# Patient Record
Sex: MALE | Race: WHITE | NOT HISPANIC OR LATINO | Employment: FULL TIME | URBAN - METROPOLITAN AREA
[De-identification: names, ages, dates, MRNs, and addresses within clinical notes are randomized per-mention and may not be internally consistent; named-entity substitution may affect disease eponyms.]

---

## 2017-02-11 ENCOUNTER — APPOINTMENT (OUTPATIENT)
Dept: GENERAL RADIOLOGY | Facility: HOSPITAL | Age: 52
End: 2017-02-11

## 2017-02-11 ENCOUNTER — HOSPITAL ENCOUNTER (EMERGENCY)
Facility: HOSPITAL | Age: 52
Discharge: HOME | End: 2017-02-11
Attending: EMERGENCY MEDICINE | Admitting: EMERGENCY MEDICINE

## 2017-02-11 ENCOUNTER — APPOINTMENT (OUTPATIENT)
Dept: CT IMAGING | Facility: HOSPITAL | Age: 52
End: 2017-02-11

## 2017-02-11 VITALS
TEMPERATURE: 98.2 F | DIASTOLIC BLOOD PRESSURE: 83 MMHG | OXYGEN SATURATION: 95 % | SYSTOLIC BLOOD PRESSURE: 108 MMHG | RESPIRATION RATE: 18 BRPM | HEIGHT: 70 IN | HEART RATE: 72 BPM | WEIGHT: 175 LBS | BODY MASS INDEX: 25.05 KG/M2

## 2017-02-11 DIAGNOSIS — R07.89 ATYPICAL CHEST PAIN: ICD-10-CM

## 2017-02-11 DIAGNOSIS — I10 ESSENTIAL HYPERTENSION: Chronic | ICD-10-CM

## 2017-02-11 DIAGNOSIS — I25.10 CORONARY ARTERY DISEASE INVOLVING NATIVE CORONARY ARTERY OF NATIVE HEART WITHOUT ANGINA PECTORIS: ICD-10-CM

## 2017-02-11 DIAGNOSIS — Z72.0 TOBACCO ABUSE: Chronic | ICD-10-CM

## 2017-02-11 DIAGNOSIS — J18.9 PNEUMONIA OF LEFT LOWER LOBE DUE TO INFECTIOUS ORGANISM: Primary | ICD-10-CM

## 2017-02-11 LAB
ALBUMIN SERPL-MCNC: 3.7 G/DL (ref 3.2–4.8)
ALBUMIN/GLOB SERPL: 1.2 G/DL (ref 1.5–2.5)
ALP SERPL-CCNC: 107 U/L (ref 25–100)
ALT SERPL W P-5'-P-CCNC: 58 U/L (ref 7–40)
ANION GAP SERPL CALCULATED.3IONS-SCNC: 6 MMOL/L (ref 3–11)
APTT PPP: 87.8 SECONDS (ref 24–31)
AST SERPL-CCNC: 48 U/L (ref 0–33)
BASOPHILS # BLD AUTO: 0.03 10*3/MM3 (ref 0–0.2)
BASOPHILS NFR BLD AUTO: 0.5 % (ref 0–1)
BILIRUB SERPL-MCNC: 0.3 MG/DL (ref 0.3–1.2)
BNP SERPL-MCNC: 29 PG/ML (ref 0–100)
BUN BLD-MCNC: 15 MG/DL (ref 9–23)
BUN/CREAT SERPL: 21.4 (ref 7–25)
CALCIUM SPEC-SCNC: 9.6 MG/DL (ref 8.7–10.4)
CHLORIDE SERPL-SCNC: 108 MMOL/L (ref 99–109)
CO2 SERPL-SCNC: 25 MMOL/L (ref 20–31)
CREAT BLD-MCNC: 0.7 MG/DL (ref 0.6–1.3)
DEPRECATED RDW RBC AUTO: 45.6 FL (ref 37–54)
EOSINOPHIL # BLD AUTO: 0 10*3/MM3 (ref 0.1–0.3)
EOSINOPHIL NFR BLD AUTO: 0 % (ref 0–3)
ERYTHROCYTE [DISTWIDTH] IN BLOOD BY AUTOMATED COUNT: 14.5 % (ref 11.3–14.5)
GFR SERPL CREATININE-BSD FRML MDRD: 118 ML/MIN/1.73
GLOBULIN UR ELPH-MCNC: 3.1 GM/DL
GLUCOSE BLD-MCNC: 240 MG/DL (ref 70–100)
HCT VFR BLD AUTO: 36.4 % (ref 38.9–50.9)
HGB BLD-MCNC: 12.6 G/DL (ref 13.1–17.5)
IMM GRANULOCYTES # BLD: 0.04 10*3/MM3 (ref 0–0.03)
IMM GRANULOCYTES NFR BLD: 0.6 % (ref 0–0.6)
LIPASE SERPL-CCNC: 39 U/L (ref 6–51)
LYMPHOCYTES # BLD AUTO: 1.07 10*3/MM3 (ref 0.6–4.8)
LYMPHOCYTES NFR BLD AUTO: 17.2 % (ref 24–44)
MCH RBC QN AUTO: 29.9 PG (ref 27–31)
MCHC RBC AUTO-ENTMCNC: 34.6 G/DL (ref 32–36)
MCV RBC AUTO: 86.5 FL (ref 80–99)
MONOCYTES # BLD AUTO: 0.11 10*3/MM3 (ref 0–1)
MONOCYTES NFR BLD AUTO: 1.8 % (ref 0–12)
NEUTROPHILS # BLD AUTO: 4.98 10*3/MM3 (ref 1.5–8.3)
NEUTROPHILS NFR BLD AUTO: 79.9 % (ref 41–71)
PLATELET # BLD AUTO: 510 10*3/MM3 (ref 150–450)
PMV BLD AUTO: 9.2 FL (ref 6–12)
POTASSIUM BLD-SCNC: 3.7 MMOL/L (ref 3.5–5.5)
PROT SERPL-MCNC: 6.8 G/DL (ref 5.7–8.2)
RBC # BLD AUTO: 4.21 10*6/MM3 (ref 4.2–5.76)
SODIUM BLD-SCNC: 139 MMOL/L (ref 132–146)
TROPONIN I SERPL-MCNC: 0 NG/ML (ref 0–0.07)
TROPONIN I SERPL-MCNC: 0 NG/ML (ref 0–0.07)
WBC NRBC COR # BLD: 6.23 10*3/MM3 (ref 3.5–10.8)

## 2017-02-11 PROCEDURE — 25010000002 PROMETHAZINE PER 50 MG: Performed by: EMERGENCY MEDICINE

## 2017-02-11 PROCEDURE — 25010000002 HEPARIN (PORCINE) PER 1000 UNITS: Performed by: EMERGENCY MEDICINE

## 2017-02-11 PROCEDURE — 99284 EMERGENCY DEPT VISIT MOD MDM: CPT

## 2017-02-11 PROCEDURE — 83690 ASSAY OF LIPASE: CPT | Performed by: EMERGENCY MEDICINE

## 2017-02-11 PROCEDURE — 83880 ASSAY OF NATRIURETIC PEPTIDE: CPT | Performed by: EMERGENCY MEDICINE

## 2017-02-11 PROCEDURE — 93005 ELECTROCARDIOGRAM TRACING: CPT | Performed by: EMERGENCY MEDICINE

## 2017-02-11 PROCEDURE — 71275 CT ANGIOGRAPHY CHEST: CPT

## 2017-02-11 PROCEDURE — 85730 THROMBOPLASTIN TIME PARTIAL: CPT | Performed by: EMERGENCY MEDICINE

## 2017-02-11 PROCEDURE — 0 IOPAMIDOL PER 1 ML: Performed by: EMERGENCY MEDICINE

## 2017-02-11 PROCEDURE — 84484 ASSAY OF TROPONIN QUANT: CPT

## 2017-02-11 PROCEDURE — 96368 THER/DIAG CONCURRENT INF: CPT

## 2017-02-11 PROCEDURE — 96375 TX/PRO/DX INJ NEW DRUG ADDON: CPT

## 2017-02-11 PROCEDURE — 85025 COMPLETE CBC W/AUTO DIFF WBC: CPT | Performed by: EMERGENCY MEDICINE

## 2017-02-11 PROCEDURE — 80053 COMPREHEN METABOLIC PANEL: CPT | Performed by: EMERGENCY MEDICINE

## 2017-02-11 PROCEDURE — 25010000002 MORPHINE PER 10 MG: Performed by: EMERGENCY MEDICINE

## 2017-02-11 PROCEDURE — 96365 THER/PROPH/DIAG IV INF INIT: CPT

## 2017-02-11 PROCEDURE — 96366 THER/PROPH/DIAG IV INF ADDON: CPT

## 2017-02-11 PROCEDURE — 96376 TX/PRO/DX INJ SAME DRUG ADON: CPT

## 2017-02-11 PROCEDURE — 71010 HC CHEST PA OR AP: CPT

## 2017-02-11 RX ORDER — PROMETHAZINE HYDROCHLORIDE 25 MG/ML
6.25 INJECTION, SOLUTION INTRAMUSCULAR; INTRAVENOUS ONCE
Status: COMPLETED | OUTPATIENT
Start: 2017-02-11 | End: 2017-02-11

## 2017-02-11 RX ORDER — TRAMADOL HYDROCHLORIDE 50 MG/1
50 TABLET ORAL EVERY 4 HOURS PRN
Qty: 20 TABLET | Refills: 0 | Status: SHIPPED | OUTPATIENT
Start: 2017-02-11 | End: 2020-08-21

## 2017-02-11 RX ORDER — ASPIRIN 81 MG/1
324 TABLET, CHEWABLE ORAL ONCE
Status: DISCONTINUED | OUTPATIENT
Start: 2017-02-11 | End: 2017-02-11

## 2017-02-11 RX ORDER — LEVOFLOXACIN 750 MG/1
750 TABLET ORAL ONCE
Status: COMPLETED | OUTPATIENT
Start: 2017-02-11 | End: 2017-02-11

## 2017-02-11 RX ORDER — HEPARIN SODIUM 1000 [USP'U]/ML
30 INJECTION, SOLUTION INTRAVENOUS; SUBCUTANEOUS AS NEEDED
Status: DISCONTINUED | OUTPATIENT
Start: 2017-02-11 | End: 2017-02-12 | Stop reason: HOSPADM

## 2017-02-11 RX ORDER — LEVOFLOXACIN 750 MG/1
750 TABLET ORAL DAILY
Qty: 5 TABLET | Refills: 0 | Status: SHIPPED | OUTPATIENT
Start: 2017-02-11 | End: 2020-08-21

## 2017-02-11 RX ORDER — HEPARIN SODIUM 1000 [USP'U]/ML
60 INJECTION, SOLUTION INTRAVENOUS; SUBCUTANEOUS AS NEEDED
Status: DISCONTINUED | OUTPATIENT
Start: 2017-02-11 | End: 2017-02-12 | Stop reason: HOSPADM

## 2017-02-11 RX ORDER — HEPARIN SODIUM 1000 [USP'U]/ML
50.4 INJECTION, SOLUTION INTRAVENOUS; SUBCUTANEOUS ONCE
Status: COMPLETED | OUTPATIENT
Start: 2017-02-11 | End: 2017-02-11

## 2017-02-11 RX ORDER — SODIUM CHLORIDE 0.9 % (FLUSH) 0.9 %
10 SYRINGE (ML) INJECTION AS NEEDED
Status: DISCONTINUED | OUTPATIENT
Start: 2017-02-11 | End: 2017-02-12 | Stop reason: HOSPADM

## 2017-02-11 RX ORDER — ALBUTEROL SULFATE 90 UG/1
2 AEROSOL, METERED RESPIRATORY (INHALATION) EVERY 4 HOURS PRN
Qty: 1 INHALER | Refills: 0 | Status: SHIPPED | OUTPATIENT
Start: 2017-02-11 | End: 2020-08-21 | Stop reason: SDUPTHER

## 2017-02-11 RX ORDER — NITROGLYCERIN 20 MG/100ML
10-50 INJECTION INTRAVENOUS
Status: DISCONTINUED | OUTPATIENT
Start: 2017-02-11 | End: 2017-02-12 | Stop reason: HOSPADM

## 2017-02-11 RX ORDER — MORPHINE SULFATE 4 MG/ML
4 INJECTION, SOLUTION INTRAMUSCULAR; INTRAVENOUS ONCE
Status: COMPLETED | OUTPATIENT
Start: 2017-02-11 | End: 2017-02-11

## 2017-02-11 RX ADMIN — MORPHINE SULFATE 4 MG: 4 INJECTION, SOLUTION INTRAMUSCULAR; INTRAVENOUS at 20:01

## 2017-02-11 RX ADMIN — PROMETHAZINE HYDROCHLORIDE 6.25 MG: 25 INJECTION INTRAMUSCULAR; INTRAVENOUS at 20:28

## 2017-02-11 RX ADMIN — HEPARIN SODIUM 12 UNITS/KG/HR: 10000 INJECTION, SOLUTION INTRAVENOUS at 20:12

## 2017-02-11 RX ADMIN — NITROGLYCERIN 10 MCG/MIN: 20 INJECTION INTRAVENOUS at 20:48

## 2017-02-11 RX ADMIN — IOPAMIDOL 60 ML: 755 INJECTION, SOLUTION INTRAVENOUS at 21:41

## 2017-02-11 RX ADMIN — LEVOFLOXACIN 750 MG: 750 TABLET, FILM COATED ORAL at 23:29

## 2017-02-11 RX ADMIN — HEPARIN SODIUM 4000 UNITS: 1000 INJECTION, SOLUTION INTRAVENOUS; SUBCUTANEOUS at 20:10

## 2017-02-12 ENCOUNTER — HOSPITAL ENCOUNTER (EMERGENCY)
Facility: HOSPITAL | Age: 52
End: 2017-02-12

## 2017-02-12 LAB
HOLD SPECIMEN: NORMAL
HOLD SPECIMEN: NORMAL
WHOLE BLOOD HOLD SPECIMEN: NORMAL
WHOLE BLOOD HOLD SPECIMEN: NORMAL

## 2017-02-12 NOTE — ED PROVIDER NOTES
Subjective   HPI Comments: John Chávez is a 51 y.o.male who presents to the ED c/o CP which suddenly onset 25 minutes PTA. Pt describes his pain as being 10/10 severity and not radiating from his left chest. His CP did not improve with ASA or NTG from EMS. He states his sx feel identical to an MI in 2015, which required stents. Dr. Cooper is his cardiologist. Pt also reports nausea, vomiting, diaphoresis and SOA.  The patient initially reports that the pain was sudden onset but he was at the [facility], admitted left-sided chest pain which was diagnosed as pneumonia.  Further information suggest the pain worsened this evening.    Additionally, pt reports he was diagnosed with PNA in his left lung last week. He was admitted here for a week and was discharged 2 days ago.    Patient is a 51 y.o. male presenting with chest pain.   History provided by:  Patient and EMS personnel  Chest Pain   Pain location:  L chest  Pain radiates to:  Does not radiate  Pain severity:  Severe  Onset quality:  Sudden  Duration:  30 minutes  Timing:  Constant  Chronicity:  New  Relieved by:  Nothing  Ineffective treatments:  Nitroglycerin  Associated symptoms: diaphoresis, nausea, shortness of breath and vomiting    Nausea:     Severity:  Moderate    Onset quality:  Sudden    Timing:  Constant  Shortness of breath:     Severity:  Moderate    Onset quality:  Sudden    Timing:  Constant  Risk factors: high cholesterol, hypertension and male sex        Review of Systems   Constitutional: Positive for diaphoresis.   Respiratory: Positive for shortness of breath.    Cardiovascular: Positive for chest pain.   Gastrointestinal: Positive for nausea and vomiting.   All other systems reviewed and are negative.      Past Medical History   Diagnosis Date   • Asthma    • GERD (gastroesophageal reflux disease)    • Hepatitis C    • Hyperlipidemia    • Hypertension    • Myocardial infarction        Allergies   Allergen Reactions   • Codeine  Nausea And Vomiting       Past Surgical History   Procedure Laterality Date   • Cardiac catheterization N/A    • Coronary angioplasty with stent placement N/A        Family History   Problem Relation Age of Onset   • Heart disease Father        Social History     Social History   • Marital status:      Spouse name: N/A   • Number of children: N/A   • Years of education: N/A     Social History Main Topics   • Smoking status: Current Every Day Smoker     Packs/day: 1.00   • Smokeless tobacco: None   • Alcohol use No      Comment: Quit in 2007   • Drug use: No   • Sexual activity: Defer     Other Topics Concern   • None     Social History Narrative         Objective   Physical Exam   Constitutional: He is oriented to person, place, and time. He appears well-developed and well-nourished.   Appears uncomfortable.   HENT:   Head: Normocephalic and atraumatic.   Eyes: Conjunctivae are normal. No scleral icterus.   Neck: Normal range of motion. Neck supple.   Cardiovascular: Normal rate, regular rhythm and normal heart sounds.  Exam reveals no gallop and no friction rub.    No murmur heard.  Pulmonary/Chest: Effort normal and breath sounds normal. No respiratory distress. He has no wheezes. He has no rales. He exhibits tenderness (Left chest and sternal tenderness.).   Abdominal: Soft. There is no tenderness.   Musculoskeletal: Normal range of motion.   Neurological: He is alert and oriented to person, place, and time.   Skin: Skin is warm and dry.   Psychiatric: He has a normal mood and affect. His behavior is normal.   Nursing note and vitals reviewed.      Procedures         ED Course  ED Course   Comment By Time   No evidence of PE on the patient's CT of the chest.  There is a left lower lobe infiltrate which is consistent with where the patient is complaining of the chest related pain Kolby Eden MD 02/11 2227   Talked further with the patient.  He reports this does not feel much like his acute  coronary syndrome MI pain.  The pain now as reported being worse with deep breathing and movement of his arm. Kolby Eden MD 02/11 2238   Within the patient was sleeping comfortably in no distress.  Woke him up he is still complaining of pain in the left side of the chest.  The pain is not affected whatsoever by nitroglycerin and is negative enzymes and the finding of the pneumonia in the left lower lobe region with patient chest pain, feel like this is the etiology of his pain.  There is no indication at this point the hospital for further cardiac evaluation.  I discussed the findings and plan.  Patient states he wants to the hospital.  We will discharge him home to follow-up with his cardiologist for ongoing care for his prior coronary artery disease follow-up with his primary care physician and return is worsening concerns.  The patient voices understanding and agrees. Kolby Eden MD 02/11 1943         Recent Results (from the past 24 hour(s))   Comprehensive Metabolic Panel    Collection Time: 02/11/17  7:56 PM   Result Value Ref Range    Glucose 240 (H) 70 - 100 mg/dL    BUN 15 9 - 23 mg/dL    Creatinine 0.70 0.60 - 1.30 mg/dL    Sodium 139 132 - 146 mmol/L    Potassium 3.7 3.5 - 5.5 mmol/L    Chloride 108 99 - 109 mmol/L    CO2 25.0 20.0 - 31.0 mmol/L    Calcium 9.6 8.7 - 10.4 mg/dL    Total Protein 6.8 5.7 - 8.2 g/dL    Albumin 3.70 3.20 - 4.80 g/dL    ALT (SGPT) 58 (H) 7 - 40 U/L    AST (SGOT) 48 (H) 0 - 33 U/L    Alkaline Phosphatase 107 (H) 25 - 100 U/L    Total Bilirubin 0.3 0.3 - 1.2 mg/dL    eGFR Non African Amer 118 >60 mL/min/1.73    Globulin 3.1 gm/dL    A/G Ratio 1.2 (L) 1.5 - 2.5 g/dL    BUN/Creatinine Ratio 21.4 7.0 - 25.0    Anion Gap 6.0 3.0 - 11.0 mmol/L   Lipase    Collection Time: 02/11/17  7:56 PM   Result Value Ref Range    Lipase 39 6 - 51 U/L   BNP    Collection Time: 02/11/17  7:56 PM   Result Value Ref Range    BNP 29.0 0.0 - 100.0 pg/mL   Light Blue Top    Collection  Time: 02/11/17  7:56 PM   Result Value Ref Range    Extra Tube hold for add-on    Green Top (Gel)    Collection Time: 02/11/17  7:56 PM   Result Value Ref Range    Extra Tube Hold for add-ons.    Lavender Top    Collection Time: 02/11/17  7:56 PM   Result Value Ref Range    Extra Tube hold for add-on    Gold Top - SST    Collection Time: 02/11/17  7:56 PM   Result Value Ref Range    Extra Tube Hold for add-ons.    CBC Auto Differential    Collection Time: 02/11/17  7:56 PM   Result Value Ref Range    WBC 6.23 3.50 - 10.80 10*3/mm3    RBC 4.21 4.20 - 5.76 10*6/mm3    Hemoglobin 12.6 (L) 13.1 - 17.5 g/dL    Hematocrit 36.4 (L) 38.9 - 50.9 %    MCV 86.5 80.0 - 99.0 fL    MCH 29.9 27.0 - 31.0 pg    MCHC 34.6 32.0 - 36.0 g/dL    RDW 14.5 11.3 - 14.5 %    RDW-SD 45.6 37.0 - 54.0 fl    MPV 9.2 6.0 - 12.0 fL    Platelets 510 (H) 150 - 450 10*3/mm3    Neutrophil % 79.9 (H) 41.0 - 71.0 %    Lymphocyte % 17.2 (L) 24.0 - 44.0 %    Monocyte % 1.8 0.0 - 12.0 %    Eosinophil % 0.0 0.0 - 3.0 %    Basophil % 0.5 0.0 - 1.0 %    Immature Grans % 0.6 0.0 - 0.6 %    Neutrophils, Absolute 4.98 1.50 - 8.30 10*3/mm3    Lymphocytes, Absolute 1.07 0.60 - 4.80 10*3/mm3    Monocytes, Absolute 0.11 0.00 - 1.00 10*3/mm3    Eosinophils, Absolute 0.00 (L) 0.10 - 0.30 10*3/mm3    Basophils, Absolute 0.03 0.00 - 0.20 10*3/mm3    Immature Grans, Absolute 0.04 (H) 0.00 - 0.03 10*3/mm3   POC Troponin, Rapid    Collection Time: 02/11/17  8:02 PM   Result Value Ref Range    Troponin I 0.00 0.00 - 0.07 ng/mL   aPTT    Collection Time: 02/11/17  8:44 PM   Result Value Ref Range    PTT 87.8 (H) 24.0 - 31.0 seconds   POC Troponin, Rapid    Collection Time: 02/11/17  9:49 PM   Result Value Ref Range    Troponin I 0.00 0.00 - 0.07 ng/mL     Note: In addition to lab results from this visit, the labs listed above may include labs taken at another facility or during a different encounter within the last 24 hours. Please correlate lab times with ED admission and  discharge times for further clarification of the services performed during this visit.    CT Angiogram Chest With Contrast   Final Result   Abnormal   1.  No central, main, lobar, or segmental acute pulmonary embolism.     2.  Patchy left lower lobe and lingular mucus plugging and adjacent airspace    opacities, trace post obstructive atelectasis versus infectious    infiltrate/pneumonia.  Followup chest CT in 6 months to ensure resolution as    neoplastic process is a consideration.   3.  Diffuse peribronchial cuffing, bronchitis.     4.  Centrilobular pulmonary emphysema.           THIS DOCUMENT HAS BEEN ELECTRONICALLY SIGNED BY JAME HE MD      XR Chest 1 View    (Results Pending)     Vitals:    02/11/17 2245 02/11/17 2300 02/11/17 2325 02/11/17 2327   BP: 111/65 116/78  108/83   Pulse: 72      Resp:       Temp:       TempSrc:       SpO2: 95% 96% 95%    Weight:       Height:         Medications   sodium chloride 0.9 % flush 10 mL (not administered)   nitroglycerin infusion 200 mcg/mL (0 mcg/min Intravenous Stopped 2/11/17 2326)   heparin infusion 00244 units in 250 mL 0.45 % NaCl (0 Units/kg/hr × 79.4 kg Intravenous Stopped 2/11/17 2326)   heparin (porcine) injection 4,760 Units (0 Units Intravenous Hold 2/11/17 2152)   heparin (porcine) injection 2,380 Units (not administered)   Morphine sulfate (PF) injection 4 mg (4 mg Intravenous Given 2/11/17 2001)   heparin (porcine) injection 4,000 Units (4,000 Units Intravenous Given 2/11/17 2010)   promethazine (PHENERGAN) injection 6.25 mg (6.25 mg Intravenous Given 2/11/17 2028)   iopamidol (ISOVUE-370) 76 % injection 100 mL (60 mL Intravenous Given 2/11/17 2141)   levoFLOXacin (LEVAQUIN) tablet 750 mg (750 mg Oral Given 2/11/17 2329)     ECG/EMG Results (last 24 hours)     Procedure Component Value Units Date/Time    ECG 12 Lead [[id number]] Collected:  02/11/17 1941     Updated:  02/11/17 1941    ECG 12 Lead [[id number]] Collected:  02/11/17 2157     Updated:   02/11/17 2159          HEART Score  History: Slightly suspicious (+0)  ECG: Normal (+0)  Age: 45 through 65 (+1)  Risk Factors: 3 or more risk factors OR history of atherosclerotic disease (+2)  Troponin: Normal limit or lower (+0)  Total: 3             MDM  Number of Diagnoses or Management Options  Atypical chest pain:   Coronary artery disease involving native coronary artery of native heart without angina pectoris:   Essential hypertension:   Pneumonia of left lower lobe due to infectious organism:   Tobacco abuse:   Diagnosis management comments: ECG/EMG Results (last 24 hours)     ** No results found for the last 24 hours. **             Amount and/or Complexity of Data Reviewed  Clinical lab tests: reviewed  Tests in the radiology section of CPT®: reviewed  Decide to obtain previous medical records or to obtain history from someone other than the patient: yes  Obtain history from someone other than the patient: yes  Independent visualization of images, tracings, or specimens: yes        Final diagnoses:   Atypical chest pain   Coronary artery disease involving native coronary artery of native heart without angina pectoris   Essential hypertension   Tobacco abuse   Pneumonia of left lower lobe due to infectious organism       Documentation assistance provided by ariel Cortes.  Information recorded by the ariel was done at my direction and has been verified and validated by me.     John Cortes  02/11/17 2121       John Cortes  02/11/17 2246       John Cortes  02/11/17 2311       Kolby Eden MD  02/12/17 0121

## 2017-03-03 ENCOUNTER — HOSPITAL ENCOUNTER (EMERGENCY)
Dept: HOSPITAL 79 - ER1 | Age: 52
Discharge: HOME | End: 2017-03-03
Payer: COMMERCIAL

## 2017-03-03 DIAGNOSIS — Z79.82: ICD-10-CM

## 2017-03-03 DIAGNOSIS — R07.9: Primary | ICD-10-CM

## 2017-03-03 DIAGNOSIS — R11.2: ICD-10-CM

## 2017-03-03 DIAGNOSIS — Z95.5: ICD-10-CM

## 2017-03-03 DIAGNOSIS — Z88.5: ICD-10-CM

## 2017-03-03 DIAGNOSIS — F17.200: ICD-10-CM

## 2017-03-03 DIAGNOSIS — R06.02: ICD-10-CM

## 2017-03-03 DIAGNOSIS — I10: ICD-10-CM

## 2017-03-03 LAB
BUN/CREATININE RATIO: 9 (ref 0–10)
HGB BLD-MCNC: 12.5 GM/DL (ref 14–17.5)
RED BLOOD COUNT: 4.28 M/UL (ref 4.2–5.5)
WHITE BLOOD COUNT: 7.8 K/UL (ref 4.5–11)

## 2017-09-02 ENCOUNTER — APPOINTMENT (OUTPATIENT)
Dept: GENERAL RADIOLOGY | Facility: HOSPITAL | Age: 52
End: 2017-09-02

## 2017-09-02 ENCOUNTER — HOSPITAL ENCOUNTER (EMERGENCY)
Facility: HOSPITAL | Age: 52
Discharge: HOME | End: 2017-09-02
Attending: EMERGENCY MEDICINE | Admitting: EMERGENCY MEDICINE

## 2017-09-02 VITALS
WEIGHT: 175 LBS | TEMPERATURE: 98.3 F | HEIGHT: 70 IN | DIASTOLIC BLOOD PRESSURE: 114 MMHG | RESPIRATION RATE: 16 BRPM | OXYGEN SATURATION: 97 % | HEART RATE: 69 BPM | SYSTOLIC BLOOD PRESSURE: 149 MMHG | BODY MASS INDEX: 25.05 KG/M2

## 2017-09-02 DIAGNOSIS — J20.9 ACUTE BRONCHITIS, UNSPECIFIED ORGANISM: ICD-10-CM

## 2017-09-02 DIAGNOSIS — K04.7 DENTAL ABSCESS: Primary | ICD-10-CM

## 2017-09-02 DIAGNOSIS — R07.89 CHEST PAIN, ATYPICAL: ICD-10-CM

## 2017-09-02 LAB
ALBUMIN SERPL-MCNC: 4.4 G/DL (ref 3.5–5)
ALBUMIN/GLOB SERPL: 1.3 G/DL (ref 1–2)
ALP SERPL-CCNC: 90 U/L (ref 38–126)
ALT SERPL W P-5'-P-CCNC: 70 U/L (ref 13–69)
ANION GAP SERPL CALCULATED.3IONS-SCNC: 11.9 MMOL/L
AST SERPL-CCNC: 41 U/L (ref 15–46)
BASOPHILS # BLD AUTO: 0.08 10*3/MM3 (ref 0–0.2)
BASOPHILS NFR BLD AUTO: 0.5 % (ref 0–2.5)
BILIRUB SERPL-MCNC: 0.8 MG/DL (ref 0.2–1.3)
BUN BLD-MCNC: 13 MG/DL (ref 7–20)
BUN/CREAT SERPL: 16.3 (ref 6.3–21.9)
CALCIUM SPEC-SCNC: 9.9 MG/DL (ref 8.4–10.2)
CHLORIDE SERPL-SCNC: 105 MMOL/L (ref 98–107)
CO2 SERPL-SCNC: 29 MMOL/L (ref 26–30)
CREAT BLD-MCNC: 0.8 MG/DL (ref 0.6–1.3)
DEPRECATED RDW RBC AUTO: 48.7 FL (ref 37–54)
EOSINOPHIL # BLD AUTO: 0.28 10*3/MM3 (ref 0–0.7)
EOSINOPHIL NFR BLD AUTO: 1.7 % (ref 0–7)
ERYTHROCYTE [DISTWIDTH] IN BLOOD BY AUTOMATED COUNT: 14.6 % (ref 11.5–14.5)
GFR SERPL CREATININE-BSD FRML MDRD: 101 ML/MIN/1.73
GLOBULIN UR ELPH-MCNC: 3.4 GM/DL
GLUCOSE BLD-MCNC: 76 MG/DL (ref 74–98)
HCT VFR BLD AUTO: 44.6 % (ref 42–52)
HGB BLD-MCNC: 15.3 G/DL (ref 14–18)
HOLD SPECIMEN: NORMAL
HOLD SPECIMEN: NORMAL
IMM GRANULOCYTES # BLD: 0.09 10*3/MM3 (ref 0–0.06)
IMM GRANULOCYTES NFR BLD: 0.6 % (ref 0–0.6)
LYMPHOCYTES # BLD AUTO: 2.36 10*3/MM3 (ref 0.6–3.4)
LYMPHOCYTES NFR BLD AUTO: 14.6 % (ref 10–50)
MCH RBC QN AUTO: 31 PG (ref 27–31)
MCHC RBC AUTO-ENTMCNC: 34.3 G/DL (ref 30–37)
MCV RBC AUTO: 90.3 FL (ref 80–94)
MONOCYTES # BLD AUTO: 1.46 10*3/MM3 (ref 0–0.9)
MONOCYTES NFR BLD AUTO: 9.1 % (ref 0–12)
NEUTROPHILS # BLD AUTO: 11.85 10*3/MM3 (ref 2–6.9)
NEUTROPHILS NFR BLD AUTO: 73.5 % (ref 37–80)
NRBC BLD MANUAL-RTO: 0 /100 WBC (ref 0–0)
PLATELET # BLD AUTO: 220 10*3/MM3 (ref 130–400)
PMV BLD AUTO: 10.5 FL (ref 6–12)
POTASSIUM BLD-SCNC: 4.9 MMOL/L (ref 3.5–5.1)
PROT SERPL-MCNC: 7.8 G/DL (ref 6.3–8.2)
RBC # BLD AUTO: 4.94 10*6/MM3 (ref 4.7–6.1)
SODIUM BLD-SCNC: 141 MMOL/L (ref 137–145)
TROPONIN I SERPL-MCNC: 0 NG/ML (ref 0–0.05)
TROPONIN I SERPL-MCNC: <0.012 NG/ML (ref 0–0.03)
TROPONIN I SERPL-MCNC: <0.012 NG/ML (ref 0–0.03)
WBC NRBC COR # BLD: 16.12 10*3/MM3 (ref 4.8–10.8)
WHOLE BLOOD HOLD SPECIMEN: NORMAL
WHOLE BLOOD HOLD SPECIMEN: NORMAL

## 2017-09-02 PROCEDURE — 85025 COMPLETE CBC W/AUTO DIFF WBC: CPT | Performed by: EMERGENCY MEDICINE

## 2017-09-02 PROCEDURE — 84484 ASSAY OF TROPONIN QUANT: CPT | Performed by: EMERGENCY MEDICINE

## 2017-09-02 PROCEDURE — 93005 ELECTROCARDIOGRAM TRACING: CPT | Performed by: EMERGENCY MEDICINE

## 2017-09-02 PROCEDURE — 80053 COMPREHEN METABOLIC PANEL: CPT | Performed by: EMERGENCY MEDICINE

## 2017-09-02 PROCEDURE — 71010 HC CHEST PA OR AP: CPT

## 2017-09-02 PROCEDURE — 99283 EMERGENCY DEPT VISIT LOW MDM: CPT

## 2017-09-02 PROCEDURE — 96365 THER/PROPH/DIAG IV INF INIT: CPT

## 2017-09-02 PROCEDURE — 84484 ASSAY OF TROPONIN QUANT: CPT | Performed by: PHYSICIAN ASSISTANT

## 2017-09-02 RX ORDER — TRAZODONE HYDROCHLORIDE 50 MG/1
50 TABLET ORAL NIGHTLY
COMMUNITY
End: 2020-08-21

## 2017-09-02 RX ORDER — FLUOXETINE HYDROCHLORIDE 20 MG/1
40 CAPSULE ORAL DAILY
COMMUNITY
End: 2020-08-21

## 2017-09-02 RX ORDER — ALBUTEROL SULFATE 90 UG/1
2 AEROSOL, METERED RESPIRATORY (INHALATION) EVERY 4 HOURS PRN
Qty: 1 INHALER | Refills: 0 | Status: SHIPPED | OUTPATIENT
Start: 2017-09-02 | End: 2020-08-21 | Stop reason: SDUPTHER

## 2017-09-02 RX ORDER — AMOXICILLIN AND CLAVULANATE POTASSIUM 875; 125 MG/1; MG/1
1 TABLET, FILM COATED ORAL EVERY 12 HOURS
Qty: 20 TABLET | Refills: 0 | Status: SHIPPED | OUTPATIENT
Start: 2017-09-02 | End: 2020-08-21

## 2017-09-02 RX ORDER — ASPIRIN 325 MG
325 TABLET ORAL ONCE
Status: COMPLETED | OUTPATIENT
Start: 2017-09-02 | End: 2017-09-02

## 2017-09-02 RX ORDER — SODIUM CHLORIDE 0.9 % (FLUSH) 0.9 %
10 SYRINGE (ML) INJECTION AS NEEDED
Status: DISCONTINUED | OUTPATIENT
Start: 2017-09-02 | End: 2017-09-02 | Stop reason: HOSPADM

## 2017-09-02 RX ORDER — AZITHROMYCIN 250 MG/1
TABLET, FILM COATED ORAL
Qty: 6 TABLET | Refills: 0 | Status: SHIPPED | OUTPATIENT
Start: 2017-09-02 | End: 2020-08-21

## 2017-09-02 RX ORDER — CITALOPRAM 40 MG/1
40 TABLET ORAL DAILY
COMMUNITY
End: 2021-03-10

## 2017-09-02 RX ORDER — HYDROCODONE BITARTRATE AND ACETAMINOPHEN 7.5; 325 MG/1; MG/1
1 TABLET ORAL EVERY 8 HOURS PRN
Qty: 12 TABLET | Refills: 0 | Status: SHIPPED | OUTPATIENT
Start: 2017-09-02 | End: 2020-08-21

## 2017-09-02 RX ORDER — CLINDAMYCIN PHOSPHATE 900 MG/50ML
900 INJECTION, SOLUTION INTRAVENOUS ONCE
Status: COMPLETED | OUTPATIENT
Start: 2017-09-02 | End: 2017-09-02

## 2017-09-02 RX ORDER — HYDROCODONE BITARTRATE AND ACETAMINOPHEN 7.5; 325 MG/1; MG/1
1 TABLET ORAL ONCE
Status: COMPLETED | OUTPATIENT
Start: 2017-09-02 | End: 2017-09-02

## 2017-09-02 RX ADMIN — CLINDAMYCIN PHOSPHATE 900 MG: 900 INJECTION, SOLUTION INTRAVENOUS at 13:03

## 2017-09-02 RX ADMIN — HYDROCODONE BITARTRATE AND ACETAMINOPHEN 1 TABLET: 7.5; 325 TABLET ORAL at 13:01

## 2017-09-02 RX ADMIN — ASPIRIN 325 MG ORAL TABLET 325 MG: 325 PILL ORAL at 13:01

## 2017-09-02 NOTE — ED NOTES
Pt did not report he was having chest pain to the  when he signed in to be seen. Told the triage nurse, EG performed in triage.      Judith Cody, RN  09/02/17 6418

## 2017-09-02 NOTE — ED PROVIDER NOTES
Subjective   HPI Comments: 51-year-old male here with a right upper canine toothache since yesterday with associated right maxillary swelling.  The pain is moderate to severe achy type pain with radiation to the right ear.      Aggravating factors: Palpation of the face or the tooth.  Alleviating factors, and treatment prior to arrival: Ibuprofen 800 mg at 10:30 this morning.    His second chief complaint is a productive cough of green sputum with intermittent wheezing and shortness of breath for a few days.  He is a smoker and has asthma.  His third chief complaint is a mild to moderate nonradiating sharp, stabbing pain in his sternal region since around 10:30 this morning, intermittent in nature, lasting for a few seconds per episode and worse with coughing.  It is different from his previous angina.  He does have a history of a myocardial infarction and stent.    Aggravating factors: Coughing.  Alleviating factors: Not coughing.  Treatment prior to arrival: Ibuprofen 800 mg tablet.      History provided by:  Patient  History limited by: Nothing.   used: No        Review of Systems   Constitutional: Negative.    HENT: Positive for dental problem and facial swelling.    Eyes: Negative.    Respiratory: Positive for cough and shortness of breath.    Cardiovascular: Positive for chest pain.   Gastrointestinal: Negative.  Negative for abdominal pain.   Endocrine: Negative.    Genitourinary: Negative for dysuria.   Musculoskeletal: Negative.    Skin: Negative.    Allergic/Immunologic: Negative.    Neurological: Negative.    Hematological: Negative.    Psychiatric/Behavioral: Negative.    All other systems reviewed and are negative.      Past Medical History:   Diagnosis Date   • Asthma    • GERD (gastroesophageal reflux disease)    • Hepatitis C    • Hyperlipidemia    • Hypertension    • Myocardial infarction        Allergies   Allergen Reactions   • Codeine Nausea And Vomiting       Past Surgical  History:   Procedure Laterality Date   • CARDIAC CATHETERIZATION N/A    • CORONARY ANGIOPLASTY WITH STENT PLACEMENT N/A        Family History   Problem Relation Age of Onset   • Heart disease Father        Social History     Social History   • Marital status:      Spouse name: N/A   • Number of children: N/A   • Years of education: N/A     Social History Main Topics   • Smoking status: Current Every Day Smoker     Packs/day: 1.00   • Smokeless tobacco: None   • Alcohol use No      Comment: Quit in 2007   • Drug use: No   • Sexual activity: Defer     Other Topics Concern   • None     Social History Narrative           Objective   Physical Exam   Constitutional: He is oriented to person, place, and time. He appears well-developed and well-nourished. No distress.   HENT:   Head: Normocephalic and atraumatic.   Right Ear: External ear normal.   Left Ear: External ear normal.   Eyes: EOM are normal. Pupils are equal, round, and reactive to light.   Neck: Normal range of motion. Neck supple.   Cardiovascular: Normal rate, regular rhythm and normal heart sounds.    No calf tenderness or ankle edema.   Pulmonary/Chest: Effort normal and breath sounds normal. No stridor. He has no wheezes. He exhibits tenderness.   Sternal cartilages are tender to palpation.   Abdominal: Soft. He exhibits no distension. There is no tenderness. There is no rebound and no guarding.   Musculoskeletal: Normal range of motion. He exhibits no edema.   Neurological: He is alert and oriented to person, place, and time.   Skin: Skin is warm and dry. No rash noted. He is not diaphoretic.   Psychiatric: He has a normal mood and affect. His behavior is normal. Judgment and thought content normal.   Nursing note and vitals reviewed.      ECG 12 Lead    Date/Time: 9/2/2017 12:56 PM  Performed by: ARGELIA SOLIS  Authorized by: MICHAEL RICKETTS   Comments: EKG: Sinus rhythm at rate of 73.  No ischemia or infarction.  Normal axis and intervals.   No ectopy.               ED Course  ED Course   Comment By Time   Case discussed with Dr. Everett: Home with Augmentin and Zithromax.  Augmentin the cover the tooth and Zithromax to cover bronchitis.  Clindamycin will not cover respiratory josue very well. Juvenal Sharma PA-C 09/02 1424                  MDM  Number of Diagnoses or Management Options  Acute bronchitis, unspecified organism: new and requires workup  Chest pain, atypical: new and requires workup  Dental abscess: new and requires workup     Amount and/or Complexity of Data Reviewed  Clinical lab tests: reviewed and ordered  Tests in the radiology section of CPT®: ordered and reviewed  Tests in the medicine section of CPT®: ordered and reviewed  Discuss the patient with other providers: yes  Independent visualization of images, tracings, or specimens: yes    Risk of Complications, Morbidity, and/or Mortality  Presenting problems: moderate  Diagnostic procedures: low  Management options: moderate    Patient Progress  Patient progress: stable      Final diagnoses:   Dental abscess   Chest pain, atypical   Acute bronchitis, unspecified organism            Juvenal Sharma PA-C  09/02/17 2972

## 2017-09-02 NOTE — DISCHARGE INSTRUCTIONS
Return to the ER for markedly worsening facial swelling, high fevers, inability to open your jaw, eye swelling closed, severe headache, worsening chest pain, sustained heart racing, passing out, new or worsening symptoms.      Follow-up with your doctor and your dentist in 1-2 days for definitive management.  Call for appointments.

## 2017-09-20 ENCOUNTER — TRANSCRIBE ORDERS (OUTPATIENT)
Dept: CARDIOLOGY | Facility: HOSPITAL | Age: 52
End: 2017-09-20

## 2017-09-20 DIAGNOSIS — J44.9 CHRONIC OBSTRUCTIVE PULMONARY DISEASE, UNSPECIFIED COPD TYPE (HCC): Primary | ICD-10-CM

## 2017-09-25 ENCOUNTER — HOSPITAL ENCOUNTER (EMERGENCY)
Facility: HOSPITAL | Age: 52
Discharge: HOME | End: 2017-09-25
Attending: EMERGENCY MEDICINE | Admitting: EMERGENCY MEDICINE

## 2017-09-25 ENCOUNTER — APPOINTMENT (OUTPATIENT)
Dept: GENERAL RADIOLOGY | Facility: HOSPITAL | Age: 52
End: 2017-09-25

## 2017-09-25 VITALS
DIASTOLIC BLOOD PRESSURE: 82 MMHG | RESPIRATION RATE: 18 BRPM | WEIGHT: 175 LBS | HEART RATE: 66 BPM | TEMPERATURE: 98.7 F | BODY MASS INDEX: 25.05 KG/M2 | SYSTOLIC BLOOD PRESSURE: 119 MMHG | OXYGEN SATURATION: 98 % | HEIGHT: 70 IN

## 2017-09-25 DIAGNOSIS — R07.9 CHEST PAIN, UNSPECIFIED TYPE: Primary | ICD-10-CM

## 2017-09-25 LAB
ALBUMIN SERPL-MCNC: 4.5 G/DL (ref 3.5–5)
ALBUMIN/GLOB SERPL: 1.6 G/DL (ref 1–2)
ALP SERPL-CCNC: 74 U/L (ref 38–126)
ALT SERPL W P-5'-P-CCNC: 45 U/L (ref 13–69)
AMPHET+METHAMPHET UR QL: NEGATIVE
AMPHETAMINES UR QL: NEGATIVE
ANION GAP SERPL CALCULATED.3IONS-SCNC: 18.1 MMOL/L
AST SERPL-CCNC: 30 U/L (ref 15–46)
BARBITURATES UR QL SCN: NEGATIVE
BASOPHILS # BLD AUTO: 0.09 10*3/MM3 (ref 0–0.2)
BASOPHILS NFR BLD AUTO: 1 % (ref 0–2.5)
BENZODIAZ UR QL SCN: NEGATIVE
BILIRUB SERPL-MCNC: 0.6 MG/DL (ref 0.2–1.3)
BILIRUB UR QL STRIP: NEGATIVE
BUN BLD-MCNC: 11 MG/DL (ref 7–20)
BUN/CREAT SERPL: 11 (ref 6.3–21.9)
BUPRENORPHINE SERPL-MCNC: NEGATIVE NG/ML
CALCIUM SPEC-SCNC: 10.1 MG/DL (ref 8.4–10.2)
CANNABINOIDS SERPL QL: NEGATIVE
CHLORIDE SERPL-SCNC: 109 MMOL/L (ref 98–107)
CLARITY UR: CLEAR
CO2 SERPL-SCNC: 25 MMOL/L (ref 26–30)
COCAINE UR QL: NEGATIVE
COLOR UR: YELLOW
CREAT BLD-MCNC: 1 MG/DL (ref 0.6–1.3)
DEPRECATED RDW RBC AUTO: 45.4 FL (ref 37–54)
EOSINOPHIL # BLD AUTO: 0.19 10*3/MM3 (ref 0–0.7)
EOSINOPHIL NFR BLD AUTO: 2 % (ref 0–7)
ERYTHROCYTE [DISTWIDTH] IN BLOOD BY AUTOMATED COUNT: 14.2 % (ref 11.5–14.5)
GFR SERPL CREATININE-BSD FRML MDRD: 78 ML/MIN/1.73
GLOBULIN UR ELPH-MCNC: 2.9 GM/DL
GLUCOSE BLD-MCNC: 122 MG/DL (ref 74–98)
GLUCOSE UR STRIP-MCNC: NEGATIVE MG/DL
HCT VFR BLD AUTO: 41.7 % (ref 42–52)
HGB BLD-MCNC: 14.8 G/DL (ref 14–18)
HGB UR QL STRIP.AUTO: NEGATIVE
HOLD SPECIMEN: NORMAL
HOLD SPECIMEN: NORMAL
IMM GRANULOCYTES # BLD: 0.04 10*3/MM3 (ref 0–0.06)
IMM GRANULOCYTES NFR BLD: 0.4 % (ref 0–0.6)
KETONES UR QL STRIP: NEGATIVE
LEUKOCYTE ESTERASE UR QL STRIP.AUTO: NEGATIVE
LYMPHOCYTES # BLD AUTO: 2.06 10*3/MM3 (ref 0.6–3.4)
LYMPHOCYTES NFR BLD AUTO: 22.2 % (ref 10–50)
MCH RBC QN AUTO: 31.6 PG (ref 27–31)
MCHC RBC AUTO-ENTMCNC: 35.5 G/DL (ref 30–37)
MCV RBC AUTO: 88.9 FL (ref 80–94)
METHADONE UR QL SCN: NEGATIVE
MONOCYTES # BLD AUTO: 0.57 10*3/MM3 (ref 0–0.9)
MONOCYTES NFR BLD AUTO: 6.1 % (ref 0–12)
NEUTROPHILS # BLD AUTO: 6.35 10*3/MM3 (ref 2–6.9)
NEUTROPHILS NFR BLD AUTO: 68.3 % (ref 37–80)
NITRITE UR QL STRIP: NEGATIVE
NRBC BLD MANUAL-RTO: 0 /100 WBC (ref 0–0)
OPIATES UR QL: NEGATIVE
OXYCODONE UR QL SCN: NEGATIVE
PCP UR QL SCN: NEGATIVE
PH UR STRIP.AUTO: 7 [PH] (ref 5–8)
PLATELET # BLD AUTO: 236 10*3/MM3 (ref 130–400)
PMV BLD AUTO: 10.5 FL (ref 6–12)
POTASSIUM BLD-SCNC: 4.1 MMOL/L (ref 3.5–5.1)
PROPOXYPH UR QL: NEGATIVE
PROT SERPL-MCNC: 7.4 G/DL (ref 6.3–8.2)
PROT UR QL STRIP: NEGATIVE
RBC # BLD AUTO: 4.69 10*6/MM3 (ref 4.7–6.1)
SODIUM BLD-SCNC: 148 MMOL/L (ref 137–145)
SP GR UR STRIP: 1.01 (ref 1–1.03)
TRICYCLICS UR QL SCN: NEGATIVE
TROPONIN I SERPL-MCNC: <0.012 NG/ML (ref 0–0.03)
TROPONIN I SERPL-MCNC: <0.012 NG/ML (ref 0–0.03)
UROBILINOGEN UR QL STRIP: NORMAL
WBC NRBC COR # BLD: 9.3 10*3/MM3 (ref 4.8–10.8)
WHOLE BLOOD HOLD SPECIMEN: NORMAL
WHOLE BLOOD HOLD SPECIMEN: NORMAL

## 2017-09-25 PROCEDURE — 93005 ELECTROCARDIOGRAM TRACING: CPT | Performed by: EMERGENCY MEDICINE

## 2017-09-25 PROCEDURE — 80053 COMPREHEN METABOLIC PANEL: CPT | Performed by: NURSE PRACTITIONER

## 2017-09-25 PROCEDURE — 71020 HC CHEST PA AND LATERAL: CPT

## 2017-09-25 PROCEDURE — 80306 DRUG TEST PRSMV INSTRMNT: CPT | Performed by: NURSE PRACTITIONER

## 2017-09-25 PROCEDURE — 84484 ASSAY OF TROPONIN QUANT: CPT | Performed by: NURSE PRACTITIONER

## 2017-09-25 PROCEDURE — 81003 URINALYSIS AUTO W/O SCOPE: CPT | Performed by: NURSE PRACTITIONER

## 2017-09-25 PROCEDURE — 85025 COMPLETE CBC W/AUTO DIFF WBC: CPT | Performed by: NURSE PRACTITIONER

## 2017-09-25 PROCEDURE — 96361 HYDRATE IV INFUSION ADD-ON: CPT

## 2017-09-25 PROCEDURE — 99284 EMERGENCY DEPT VISIT MOD MDM: CPT

## 2017-09-25 PROCEDURE — 96360 HYDRATION IV INFUSION INIT: CPT

## 2017-09-25 RX ORDER — NITROGLYCERIN 0.4 MG/1
0.4 TABLET SUBLINGUAL
Status: DISCONTINUED | OUTPATIENT
Start: 2017-09-25 | End: 2017-09-25 | Stop reason: HOSPADM

## 2017-09-25 RX ORDER — SODIUM CHLORIDE 0.9 % (FLUSH) 0.9 %
10 SYRINGE (ML) INJECTION AS NEEDED
Status: DISCONTINUED | OUTPATIENT
Start: 2017-09-25 | End: 2017-09-25 | Stop reason: HOSPADM

## 2017-09-25 RX ORDER — ASPIRIN 81 MG/1
324 TABLET, CHEWABLE ORAL ONCE
Status: COMPLETED | OUTPATIENT
Start: 2017-09-25 | End: 2017-09-25

## 2017-09-25 RX ADMIN — SODIUM CHLORIDE 1000 ML: 9 INJECTION, SOLUTION INTRAVENOUS at 14:47

## 2017-09-25 RX ADMIN — NITROGLYCERIN 0.4 MG: 0.4 TABLET SUBLINGUAL at 15:31

## 2017-09-25 RX ADMIN — ASPIRIN 81 MG 324 MG: 81 TABLET ORAL at 14:43

## 2017-09-25 RX ADMIN — NITROGLYCERIN 0.4 MG: 0.4 TABLET SUBLINGUAL at 14:45

## 2017-09-28 ENCOUNTER — TRANSCRIBE ORDERS (OUTPATIENT)
Dept: CARDIOLOGY | Facility: HOSPITAL | Age: 52
End: 2017-09-28

## 2017-10-05 ENCOUNTER — TELEPHONE (OUTPATIENT)
Dept: SURGERY | Facility: CLINIC | Age: 52
End: 2017-10-05

## 2017-10-05 NOTE — TELEPHONE ENCOUNTER
PATIENT NO SHOW SENT LETTER CALLED PCP UNABLE TO LEAVE MESSAGE FOR PT NO VM SET LEFT ALYSON LEI AT PCP

## 2020-08-21 ENCOUNTER — OFFICE VISIT (OUTPATIENT)
Dept: FAMILY MEDICINE CLINIC | Facility: CLINIC | Age: 55
End: 2020-08-21

## 2020-08-21 VITALS
BODY MASS INDEX: 22.48 KG/M2 | SYSTOLIC BLOOD PRESSURE: 110 MMHG | WEIGHT: 157 LBS | OXYGEN SATURATION: 98 % | HEART RATE: 76 BPM | HEIGHT: 70 IN | DIASTOLIC BLOOD PRESSURE: 78 MMHG

## 2020-08-21 DIAGNOSIS — I10 ESSENTIAL HYPERTENSION: Chronic | ICD-10-CM

## 2020-08-21 DIAGNOSIS — M54.16 LUMBAR RADICULOPATHY, CHRONIC: ICD-10-CM

## 2020-08-21 DIAGNOSIS — M65.30 TRIGGER FINGER OF RIGHT HAND, UNSPECIFIED FINGER: ICD-10-CM

## 2020-08-21 DIAGNOSIS — J43.8 OTHER EMPHYSEMA (HCC): ICD-10-CM

## 2020-08-21 DIAGNOSIS — M25.552 LEFT HIP PAIN: Primary | ICD-10-CM

## 2020-08-21 PROCEDURE — 99204 OFFICE O/P NEW MOD 45 MIN: CPT | Performed by: INTERNAL MEDICINE

## 2020-08-21 RX ORDER — MELOXICAM 15 MG/1
15 TABLET ORAL DAILY
COMMUNITY
End: 2021-01-20

## 2020-08-21 RX ORDER — ALBUTEROL SULFATE 90 UG/1
2 AEROSOL, METERED RESPIRATORY (INHALATION) EVERY 4 HOURS PRN
Qty: 29 G | Refills: 11 | Status: SHIPPED | OUTPATIENT
Start: 2020-08-21 | End: 2021-05-14 | Stop reason: SDUPTHER

## 2020-08-21 NOTE — PROGRESS NOTES
John Chávez  1965  [id number]  Patient Care Team:  Eduardo Jiménez MD as PCP - General (Internal Medicine)  Diony Ochoa MD as Consulting Physician (General Surgery)    John Chávez is a 54 y.o. male here today to establish care.  This patient is accompanied by their self who contributes to the history of their care.    Chief Complaint:    Chief Complaint   Patient presents with   • Establish Care   • Hip Pain     lt hip pain has been seen at  for this. Only hurts when walking   • Hand Pain     rt hand trigger fingers can't make a fist, hard to         History of Present Illness:     Ms. Oseguera is a 54-year-old gentleman who presents to establish care.  He had been receiving health care from what sounds like a physician extender and having difficulty getting in.  He was seen approximately 1 year ago at  for left hip pain worse with walking or walking up inclines.  There is associated numbness and tingling.  Pain would go down to the level of his knee.  There is some weakness.  He denies any nocturnal pain or coldness in his feet.  He does have a coronary artery disease with a history of MI and PTCA in 2015.  He continues to smoke.  He denies any history of back trauma.  Other than ambulating and ambulating upgrade he cannot determine any exacerbating features.  Pain originates just superior to his posterior iliac spine he can radiate into his hip down to his leg.  It resolves with rest.  Does tend to drag his left toe.  Additionally has had a trigger finger on the right hand (his non-dominant hand.  This was treated with injections.  However on his left hand is his dominant hand he has been having difficulty with his ring finger catching.  Painfully snaps.  He does take Mobic which helps with the swelling in his hands.  He cannot recall any trauma.  Finally he needs a refill on his albuterol.  He takes this as a rescue inhaler.  Typically is maintained on Breo.  He has not seen the  pulmonologist in years.  No cough just occasional wheezing.  He may use his albuterol inhaler once every 2 weeks.    Past Medical History:   Diagnosis Date   • Asthma    • GERD (gastroesophageal reflux disease)    • Hepatitis C    • Hyperlipidemia    • Hypertension    • Myocardial infarction (CMS/HCC)        Past Surgical History:   Procedure Laterality Date   • CARDIAC CATHETERIZATION N/A    • CORONARY ANGIOPLASTY WITH STENT PLACEMENT N/A         Family History   Problem Relation Age of Onset   • Heart disease Father    • Aneurysm Mother    • Arthritis Mother        Social History     Socioeconomic History   • Marital status:      Spouse name: Not on file   • Number of children: Not on file   • Years of education: Not on file   • Highest education level: Not on file   Tobacco Use   • Smoking status: Current Every Day Smoker     Packs/day: 1.00   Substance and Sexual Activity   • Alcohol use: No     Comment: Quit in 2007   • Drug use: No     Comment: 2/3/2017   • Sexual activity: Defer       Allergies   Allergen Reactions   • Codeine Nausea And Vomiting       Review of Systems:    Review of Systems   Constitutional: Negative.  Negative for chills, fatigue, fever, unexpected weight gain and unexpected weight loss.   HENT: Negative.  Negative for ear pain, postnasal drip, sinus pressure and sore throat.    Eyes: Negative.  Negative for blurred vision, double vision and visual disturbance.   Respiratory: Positive for cough and wheezing. Negative for shortness of breath.    Cardiovascular: Negative for chest pain, palpitations and leg swelling.   Gastrointestinal: Negative for abdominal pain, blood in stool, diarrhea, nausea and vomiting.   Endocrine: Negative for cold intolerance, heat intolerance, polydipsia, polyphagia and polyuria.   Genitourinary: Negative.  Negative for dysuria, flank pain and hematuria.   Musculoskeletal: Positive for arthralgias, back pain and gait problem. Negative for joint swelling.  "       Left hip pain, left fourth finger trigger   Skin: Negative for dry skin and rash.   Allergic/Immunologic: Negative.    Neurological: Positive for numbness. Negative for weakness and headache.   Hematological: Negative.    Psychiatric/Behavioral: Negative.  Negative for self-injury, suicidal ideas and depressed mood.       Vitals:    08/21/20 1247   BP: 110/78   Pulse: 76   SpO2: 98%   Weight: 71.2 kg (157 lb)   Height: 177 cm (69.69\")     Body mass index is 22.73 kg/m².      Current Outpatient Medications:   •  albuterol sulfate  (90 Base) MCG/ACT inhaler, Inhale 2 puffs Every 4 (Four) Hours As Needed for Wheezing., Disp: 29 g, Rfl: 11  •  aspirin 81 MG chewable tablet, Chew 81 mg daily., Disp: , Rfl:   •  citalopram (CeleXA) 40 MG tablet, Take 40 mg by mouth Daily., Disp: , Rfl:   •  Fluticasone Furoate-Vilanterol (Breo Ellipta) 100-25 MCG/INH inhaler, Inhale 1 puff Daily., Disp: , Rfl:   •  meloxicam (MOBIC) 15 MG tablet, Take 15 mg by mouth Daily., Disp: , Rfl:     Physical Exam:    Physical Exam   Constitutional: He is oriented to person, place, and time. He appears well-developed and well-nourished. No distress.   HENT:   Head: Normocephalic and atraumatic.   Right Ear: External ear normal.   Left Ear: External ear normal.   Mouth/Throat: Oropharynx is clear and moist. No oropharyngeal exudate.   Eyes: Conjunctivae and EOM are normal. Right eye exhibits no discharge. No scleral icterus.   Neck: Normal range of motion. Neck supple. No JVD present. No tracheal deviation present. No thyromegaly present.   Cardiovascular: Normal rate, regular rhythm, normal heart sounds and intact distal pulses.   PMI nondisplaced   Pulmonary/Chest: Effort normal and breath sounds normal. He has no wheezes. He has no rales.   No dullness to percussion    Abdominal: Soft. Bowel sounds are normal. There is no tenderness. There is no rebound and no guarding.   Musculoskeletal: Normal range of motion. He exhibits no " edema, tenderness or deformity.   Normal gait, straight leg raise negative.  He has difficulty with heel walk predominantly on the left.  He does have tenderness to palpation in the left L4-L5 paraspinous region.  NIKI ER test is negative.   Lymphadenopathy:     He has no cervical adenopathy.   Neurological: He is alert and oriented to person, place, and time. He exhibits normal muscle tone. Coordination normal.   Skin: Skin is warm and dry. Capillary refill takes less than 2 seconds. No rash noted. He is not diaphoretic. No pallor.   Psychiatric: He has a normal mood and affect. Judgment normal.   Nursing note and vitals reviewed.      Procedures    Results Review:          Assessment/Plan:      Problem List Items Addressed This Visit        Cardiovascular and Mediastinum    Hypertension (Chronic)    Relevant Orders    Ambulatory Referral to Hand Surgery       Respiratory    Other emphysema (CMS/HCC)    Relevant Medications    Fluticasone Furoate-Vilanterol (Breo Ellipta) 100-25 MCG/INH inhaler    albuterol sulfate  (90 Base) MCG/ACT inhaler      Other Visit Diagnoses     Left hip pain    -  Primary    Relevant Orders    Ambulatory Referral to Neurosurgery    Ambulatory Referral to Physical Therapy    Trigger finger of right hand, unspecified finger        Relevant Orders    Ambulatory Referral to Hand Surgery    Lumbar radiculopathy, chronic          I have referred John to physical therapy as well as neurosurgery for his back.  He does have a warm feet with palpable pulses.  Claudication could present similarly however I think this is neuro claudication.  He likely will require advanced imaging.  He will continue Mobic.    I referred him also to hand surgery for injection of the trigger finger on his dominant left hand.    Refilled his albuterol and he should continue his Breo.  I will see him back in 4 to 6 weeks for physical exam.    At this time will discuss strategies for tobacco cessation.    Plan  of care reviewed with patient at the conclusion of today's visit. Education was provided regarding diagnosis and management.  Patient verbalizes understanding of and agreement with management plan.    Return in about 6 weeks (around 10/2/2020) for Annual.    Eduardo Jiménez MD    Please note that portions of this note may have been completed with a voice recognition program. Efforts were made to edit the dictations, but occasionally words are mistranscribed.

## 2020-11-11 ENCOUNTER — TELEPHONE (OUTPATIENT)
Dept: FAMILY MEDICINE CLINIC | Facility: CLINIC | Age: 55
End: 2020-11-11

## 2020-11-11 NOTE — TELEPHONE ENCOUNTER
[facility] CALLED AND PATIENT WILL BE HOSP FOLLOW UP, PT BEING DISCHARGED TODAY, [id number] FOR CHEST PAIN, PATIENT HAS HISTORY OF AAA AND WILL BE FOLLOWING UP WITH VASCULAR TEAM ON OUTPATIENT BASIS; PATIENT HAS APPT SCHEDULED [id number] AT 1115A WITH DR CHATMAN; THEY WILL BE FAXING INFORMATION OVER

## 2021-01-20 ENCOUNTER — OFFICE VISIT (OUTPATIENT)
Dept: FAMILY MEDICINE CLINIC | Facility: CLINIC | Age: 56
End: 2021-01-20

## 2021-01-20 VITALS
DIASTOLIC BLOOD PRESSURE: 88 MMHG | HEART RATE: 89 BPM | HEIGHT: 70 IN | SYSTOLIC BLOOD PRESSURE: 130 MMHG | OXYGEN SATURATION: 99 % | WEIGHT: 178 LBS | BODY MASS INDEX: 25.48 KG/M2

## 2021-01-20 DIAGNOSIS — M25.552 LEFT HIP PAIN: Primary | ICD-10-CM

## 2021-01-20 DIAGNOSIS — K59.00 CONSTIPATION, UNSPECIFIED CONSTIPATION TYPE: ICD-10-CM

## 2021-01-20 PROCEDURE — 99214 OFFICE O/P EST MOD 30 MIN: CPT | Performed by: INTERNAL MEDICINE

## 2021-01-20 RX ORDER — DICLOFENAC SODIUM 75 MG/1
75 TABLET, DELAYED RELEASE ORAL 2 TIMES DAILY
Qty: 60 TABLET | Refills: 5 | Status: SHIPPED | OUTPATIENT
Start: 2021-01-20 | End: 2021-02-05 | Stop reason: HOSPADM

## 2021-01-20 NOTE — PROGRESS NOTES
John Chávez  1965  [id number]  Patient Care Team:  Eduardo Jiménez MD as PCP - General (Internal Medicine)  Diony Ochoa MD as Consulting Physician (General Surgery)    John Chávez is a 55 y.o. male here today for follow up.     This patient is accompanied by their self who contributes to the history of their care.    Chief Complaint:    Chief Complaint   Patient presents with   • Hip Pain     Lt side - having trouble walking long distances          History of Present Illness:  I have reviewed and/or updated the patient's past medical, past surgical, family, social history, problem list and allergies as appropriate.   Seen in August for this. He reports that he does not drive. He walks everywhere. He has a stop 5-7 times per attempt to ambulate. He reports left hip pain that sharp in nature. Eases with rest. Occasionally he indicates that his left lower extremity will go numb. The pain however is sharp and is inguinal region trochanteric region and iliac region. Denies any weakness. No saddle anesthesia. Denies any rest pain. Does have a history of vascular disease history of multiple coronary stents. Is taking Mobic however there is no improvement with this. He declines pain medication as he has been sober on Suboxone for 3 years. He tells me that several years ago at [facility] he was told he may need hip surgery in the future.    Additionally he is having worsening constipation over the past several months. No blood in stool. There is been no weight loss. There has been increasing dependency upon laxatives. He is now having to take something daily. No family history of colon cancer. He does indicate that he had a remote colonoscopy. There is been abdominal distention but no nausea vomiting. No abdominal pain.       Review of Systems:    Review of Systems   Constitutional: Negative for chills, fatigue, fever, unexpected weight gain and unexpected weight loss.   HENT: Negative for ear  "pain, postnasal drip, sinus pressure and sore throat.    Eyes: Negative for blurred vision, double vision and visual disturbance.   Respiratory: Negative for cough, shortness of breath and wheezing.    Cardiovascular: Negative for chest pain, palpitations and leg swelling.   Gastrointestinal: Positive for abdominal pain. Negative for blood in stool, diarrhea, nausea and vomiting.   Endocrine: Negative for cold intolerance, heat intolerance, polydipsia, polyphagia and polyuria.   Genitourinary: Negative for dysuria, flank pain and hematuria.   Musculoskeletal: Positive for arthralgias. Negative for joint swelling.   Skin: Negative for dry skin and rash.   Neurological: Positive for numbness. Negative for weakness and headache.   Psychiatric/Behavioral: Negative for self-injury, suicidal ideas and depressed mood.       Vitals:    01/20/21 1205   BP: 130/88   Pulse: 89   SpO2: 99%   Weight: 80.7 kg (178 lb)   Height: 177 cm (69.69\")   PainSc: 0-No pain     Body mass index is 25.77 kg/m².      Current Outpatient Medications:   •  albuterol sulfate  (90 Base) MCG/ACT inhaler, Inhale 2 puffs Every 4 (Four) Hours As Needed for Wheezing., Disp: 29 g, Rfl: 11  •  aspirin 81 MG chewable tablet, Chew 81 mg daily., Disp: , Rfl:   •  citalopram (CeleXA) 40 MG tablet, Take 40 mg by mouth Daily., Disp: , Rfl:   •  Fluticasone Furoate-Vilanterol (Breo Ellipta) 100-25 MCG/INH inhaler, Inhale 1 puff Daily., Disp: , Rfl:   •  diclofenac (VOLTAREN) 75 MG EC tablet, Take 1 tablet by mouth 2 (Two) Times a Day., Disp: 60 tablet, Rfl: 5    Physical Exam:    Physical Exam  Vitals signs and nursing note reviewed.   Constitutional:       General: He is not in acute distress.     Appearance: He is well-developed. He is not diaphoretic.   HENT:      Head: Normocephalic and atraumatic.      Right Ear: External ear normal.      Left Ear: External ear normal.      Mouth/Throat:      Pharynx: No oropharyngeal exudate.   Eyes:      General: " No scleral icterus.        Right eye: No discharge.      Conjunctiva/sclera: Conjunctivae normal.   Neck:      Musculoskeletal: Normal range of motion and neck supple.      Thyroid: No thyromegaly.      Vascular: No JVD.      Trachea: No tracheal deviation.   Cardiovascular:      Rate and Rhythm: Normal rate and regular rhythm.      Heart sounds: Normal heart sounds.      Comments: PMI nondisplaced  Pulmonary:      Effort: Pulmonary effort is normal.      Breath sounds: Normal breath sounds. No wheezing or rales.   Abdominal:      General: Bowel sounds are normal.      Palpations: Abdomen is soft.      Tenderness: There is no abdominal tenderness. There is no guarding or rebound.      Comments: Distention. There is no shifting dullness.   Musculoskeletal:         General: Tenderness present.      Left lower leg: No edema.      Comments: Normal gait. Is marked tenderness with forced external rotation of the left hip. Pelvic rocking is unremarkable. Straight leg raise negative. Strength is 5 out of 5 proximally distally lower extremities. Deep tendon reflexes are unremarkable. There is no paraspinous guarding or tenderness in the lumbar region. Range of motion of the lumbar sacral spine reveals normal AP flexion extension. Gait is normal   Lymphadenopathy:      Cervical: No cervical adenopathy.   Skin:     General: Skin is warm and dry.      Capillary Refill: Capillary refill takes less than 2 seconds.      Coloration: Skin is not pale.      Findings: No rash.   Neurological:      Mental Status: He is alert and oriented to person, place, and time.      Motor: No abnormal muscle tone.      Coordination: Coordination normal.   Psychiatric:         Judgment: Judgment normal.         Procedures    Results Review:    None    Assessment/Plan:     Problem List Items Addressed This Visit     None      Visit Diagnoses     Left hip pain    -  Primary    Relevant Orders    XR Hip With or Without Pelvis 2 - 3 View Left    XR  Spine Lumbar 4+ View    Ambulatory Referral to Orthopedic Surgery    Constipation, unspecified constipation type        Relevant Orders    Ambulatory Referral to Gastroenterology      Left hip pain is kind of confusing is some elements of potential radiculopathy however I think the bulk of it is degenerative in nature. I requested an x-ray as well as changed his anti-inflammatory to diclofenac. It is severe enough and limiting enough that I referred him to orthopedic surgery. X-ray of his lumbar spine is been requested as well.    His constipation, is new onset and worsening. He is having to depend upon laxatives which is unusual for him. I have referred him for gastroenterology for consideration of colonoscopy.    Plan of care reviewed with patient at the conclusion of today's visit. Education was provided regarding diagnosis and management.  Patient verbalizes understanding of and agreement with management plan.    Return in about 6 weeks (around 3/3/2021), or hip pain.    Eduardo Jiménez MD    Please note that portions of this note may have been completed with a voice recognition program. Efforts were made to edit the dictations, but occasionally words are mistranscribed.

## 2021-01-28 ENCOUNTER — OFFICE VISIT (OUTPATIENT)
Dept: ORTHOPEDIC SURGERY | Facility: CLINIC | Age: 56
End: 2021-01-28

## 2021-01-28 VITALS — WEIGHT: 178 LBS | BODY MASS INDEX: 25.48 KG/M2 | HEIGHT: 70 IN | HEART RATE: 77 BPM | OXYGEN SATURATION: 98 %

## 2021-01-28 DIAGNOSIS — M54.16 RADICULOPATHY, LUMBAR REGION: Primary | ICD-10-CM

## 2021-01-28 DIAGNOSIS — M25.552 LEFT HIP PAIN: ICD-10-CM

## 2021-01-28 PROCEDURE — 99244 OFF/OP CNSLTJ NEW/EST MOD 40: CPT | Performed by: ORTHOPAEDIC SURGERY

## 2021-01-28 RX ORDER — CLOPIDOGREL BISULFATE 75 MG/1
75 TABLET ORAL DAILY
COMMUNITY
Start: 2021-01-19 | End: 2021-03-10

## 2021-01-28 RX ORDER — ATORVASTATIN CALCIUM 40 MG/1
40 TABLET, FILM COATED ORAL
COMMUNITY
Start: 2021-01-19 | End: 2022-06-24 | Stop reason: SDUPTHER

## 2021-01-28 RX ORDER — CHOLECALCIFEROL (VITAMIN D3) 50 MCG
2000 CAPSULE ORAL DAILY
COMMUNITY
Start: 2020-11-04 | End: 2021-03-10

## 2021-01-28 RX ORDER — METHOCARBAMOL 750 MG/1
750 TABLET, FILM COATED ORAL 4 TIMES DAILY PRN
Qty: 60 TABLET | Refills: 0 | Status: SHIPPED | OUTPATIENT
Start: 2021-01-28 | End: 2021-03-10

## 2021-01-28 RX ORDER — NICOTINE POLACRILEX 4 MG/1
1 GUM, CHEWING ORAL DAILY
COMMUNITY
Start: 2020-11-23 | End: 2021-01-29 | Stop reason: SDUPTHER

## 2021-01-28 RX ORDER — BUPRENORPHINE HYDROCHLORIDE, NALOXONE HYDROCHLORIDE 8; 2 MG/1; MG/1
FILM, SOLUBLE BUCCAL; SUBLINGUAL
COMMUNITY
Start: 2021-01-22

## 2021-01-28 RX ORDER — ASPIRIN 81 MG/1
81 TABLET, COATED ORAL DAILY
COMMUNITY
Start: 2021-01-19

## 2021-01-28 RX ORDER — PRAZOSIN HYDROCHLORIDE 2 MG/1
4 CAPSULE ORAL
COMMUNITY
Start: 2021-01-19 | End: 2021-03-10

## 2021-01-28 NOTE — PROGRESS NOTES
Orthopaedic Clinic Note: Hip New Patient    Chief Complaint   Patient presents with   • Left Hip - Pain        HPI  Consult from: Eduardo Jiménez MD    John Chávez is a 55 y.o. male who presents with left hip pain for 6 month(s). Onset atraumatic and gradual in nature. Pain is localized to groin, lateral trochanter, gluteal region and is radiating down leg and is a 9/10 on the pain scale.Pain is described as dull and aching. Associated symptoms include pain, stiffness and giving way/buckling.  The pain is worse with walking, sitting, climbing stairs, sleeping and rising from seated position; resting and pain medication and/or NSAID improve the pain. Previous treatments have included: NSAIDS for 3 months duration or longer. Although some transient relief was reported with these interventions, these conservative measures have failed and symptoms have persisted. The patient is limited in daily activities and has had a significant decrease in quality of life as a result. He denies fevers, chills, or constitutional symptoms.    I have reviewed the following portions of the patient's history:History of Present Illness    Past Medical History:   Diagnosis Date   • Asthma    • GERD (gastroesophageal reflux disease)    • Hepatitis C    • Hyperlipidemia    • Hypertension    • Myocardial infarction (CMS/HCC)       Past Surgical History:   Procedure Laterality Date   • CARDIAC CATHETERIZATION N/A    • CORONARY ANGIOPLASTY WITH STENT PLACEMENT N/A       Family History   Problem Relation Age of Onset   • Heart disease Father    • Aneurysm Mother    • Arthritis Mother      Social History     Socioeconomic History   • Marital status:      Spouse name: Not on file   • Number of children: Not on file   • Years of education: Not on file   • Highest education level: Not on file   Tobacco Use   • Smoking status: Current Every Day Smoker     Packs/day: 1.00   Substance and Sexual Activity   • Alcohol use: No     Comment: Quit  in 2007   • Drug use: No     Comment: 2/3/2017   • Sexual activity: Defer      Current Outpatient Medications on File Prior to Visit   Medication Sig Dispense Refill   • albuterol sulfate  (90 Base) MCG/ACT inhaler Inhale 2 puffs Every 4 (Four) Hours As Needed for Wheezing. 29 g 11   • Aspirin Low Dose 81 MG EC tablet Take 81 mg by mouth Daily.     • atorvastatin (LIPITOR) 40 MG tablet Take 40 mg by mouth every night at bedtime.     • citalopram (CeleXA) 40 MG tablet Take 40 mg by mouth Daily.     • clopidogrel (PLAVIX) 75 MG tablet Take 75 mg by mouth Daily.     • D3 Super Strength 50 MCG (2000 UT) capsule Take 2,000 Units by mouth Daily.     • diclofenac (VOLTAREN) 75 MG EC tablet Take 1 tablet by mouth 2 (Two) Times a Day. 60 tablet 5   • Fluticasone Furoate-Vilanterol (Breo Ellipta) 100-25 MCG/INH inhaler Inhale 1 puff Daily.     • Omeprazole 20 MG tablet delayed-release Take 1 tablet by mouth Daily.     • prazosin (MINIPRESS) 2 MG capsule Take 4 mg by mouth every night at bedtime.     • Suboxone 8-2 MG film film DISSOLVE 2 FILM(S) SUBLINGUALLY 1 TIME A DAY     • [DISCONTINUED] aspirin 81 MG chewable tablet Chew 81 mg daily.       No current facility-administered medications on file prior to visit.       Allergies   Allergen Reactions   • Codeine Nausea And Vomiting        Review of Systems   Constitutional: Negative.    HENT: Negative.    Eyes: Negative.    Respiratory: Negative.    Cardiovascular: Negative.    Gastrointestinal: Negative.    Endocrine: Negative.    Genitourinary: Negative.    Musculoskeletal: Positive for arthralgias.   Skin: Negative.    Allergic/Immunologic: Negative.    Neurological: Negative.    Hematological: Negative.    Psychiatric/Behavioral: Negative.         The patient's Review of Systems was personally reviewed and confirmed as accurate.    The following portions of the patient's history were reviewed and updated as appropriate: allergies, current medications, past family  "history, past medical history, past social history, past surgical history and problem list.    Physical Exam  Pulse 77, height 177 cm (69.69\"), weight 80.7 kg (178 lb), SpO2 98 %.    Body mass index is 25.77 kg/m².    GENERAL APPEARANCE: awake, alert & oriented x 3, in no acute distress and well developed, well nourished  PSYCH: normal affect  LUNGS:  breathing nonlabored  EYES: sclera anicteric  CARDIOVASCULAR: palpable dorsalis pedis, palpable posterior tibial bilaterally. Capillary refill less than 2 seconds  EXTREMITIES: no clubbing, cyanosis  GAIT:  Normal           Right Hip Exam:  RANGE OF MOTION:   FLEXION CONTRACTURE: None   FLEXION: 110 degrees   INTERNAL ROTATION: 20 degrees at 90 degrees of flexion   EXTERNAL ROTATION: 40 degrees at 90 degrees of flexion    PAIN WITH HIP MOTION: no  PAIN WITH LOGROLL: no  STINCHFIELD TEST: negative    KNEE EXAM: full knee ROM (0-120 degrees), stable to varus and valgus stress at terminal extension and 30 degrees flexion     STRENGTH:  5/5 hip adduction, abduction, flexion. 5/5 strength knee flexion, extension. 5/5 strength ankle dorsiflexion and plantarflexion.     GREATER TROCHANTER BURSAL PAIN:  No  Tender to palpation posteriorly at the lumbar musculature, SI joint region, and gluteal musculature     REFLEXES:   PATELLAR 2+/4   ACHILLES 2+/4    CLONUS: negative  STRAIGHT LEG TEST:   negative    SENSATION TO LIGHT TOUCH:  DEEP PERONEAL/SUPERFICIAL PERONEAL/SURAL/SAPHENOUS/TIBIAL:  intact    EDEMA:   no  ERYTHEMA:  no  WOUNDS/INCISIONS: no overlying skin problems.      Left Hip Exam:   ----------  Hip Exam  ----------  FLEXION CONTRACTURE: None  FLEXION: 110 degrees  INTERNAL ROTATION: 20 degrees at 90 degrees of flexion   EXTERNAL ROTATION: 40 degrees at 90 degrees of flexion    PAIN WITH HIP MOTION: no  ----------  Knee Exam  ----------  ALIGNMENT: neutral, no varus or valgus deformity     RANGE OF MOTION:  Normal (0-120 degrees) with no extensor lag or flexion " contracture  LIGAMENTOUS STABILITY:   stable to varus and valgus stress at 0 and 30 degrees without any evidence of laxity     STRENGTH:  5/5 knee flexion, extension. 5/5 ankle dorsiflexion and plantarflexion.     PAIN WITH PALPATION: denies tenderness to palpation about the knee, denies medial or lateral joint line pain  KNEE EFFUSION: no  PAIN WITH KNEE ROM: no  PATELLAR CREPITUS: no  SPECIAL EXAM FINDINGS:  Negative patellar compression    REFLEXES:  PATELLAR 2+/4  ACHILLES 2+/4    CLONUS: negative  STRAIGHT LEG TEST:   negative    SENSATION TO LIGHT TOUCH:  DEEP PERONEAL/SUPERFICIAL PERONEAL/SURAL/SAPHENOUS/TIBIAL:   intact    EDEMA:  no  ERYTHEMA:  no  WOUNDS/INCISIONS: no overlying skin problems.      ------------------------------------------------------------------    LEG LENGTHS:  equal  _____________________________________________________  _____________________________________________________    RADIOGRAPHIC FINDINGS:   Indication: Left hip pain    Comparison: No prior xrays are available for comparison    AP pelvis, hip 2 views: Right: mild joint space narrowing, minimal osteophyte formation; Left: mild joint space narrowing, minimal osteophyte formation    Assessment/Plan:   Diagnosis Plan   1. Radiculopathy, lumbar region  methocarbamol (ROBAXIN) 750 MG tablet   2. Left hip pain  XR Hip With or Without Pelvis 2 - 3 View Left     Patient's hip pain is due to lumbar spondylosis and radiculopathy.  His hip range of motion is asymptomatic on exam today.  He has full hip range of motion without pain.  He has intermittent numbness throughout the right lower extremity.  This indicates nerve compression.  I discussed proceeding with a muscle relaxer to help with the lumbar paraspinal muscle pain as well as gluteal pain.  He is agreeable to this.  Otherwise, I would recommend referral to a spine doctor for further evaluation and discussion of his radicular pain.    Catalino Sauceda MD  01/28/21  11:11 EST

## 2021-01-29 ENCOUNTER — TELEPHONE (OUTPATIENT)
Dept: FAMILY MEDICINE CLINIC | Facility: CLINIC | Age: 56
End: 2021-01-29

## 2021-01-29 DIAGNOSIS — M54.16 LUMBAR RADICULOPATHY, CHRONIC: Primary | ICD-10-CM

## 2021-01-29 RX ORDER — NICOTINE POLACRILEX 4 MG/1
1 GUM, CHEWING ORAL DAILY
Qty: 30 EACH | Refills: 2 | Status: SHIPPED | OUTPATIENT
Start: 2021-01-29 | End: 2021-02-05 | Stop reason: HOSPADM

## 2021-01-29 NOTE — TELEPHONE ENCOUNTER
Per the note from Dr. Sauceda the patients sx may be coming from his back and not the hip. Is requesting referral.

## 2021-01-29 NOTE — TELEPHONE ENCOUNTER
Patient is requesting a call back from the clinical staff to discuss getting a referral to a different doctor for his back.    [phone]

## 2021-01-29 NOTE — TELEPHONE ENCOUNTER
Contacted patient and alerted him of the referral that has been placed. He verbalized understanding and asked for a refill of omeprazole

## 2021-02-01 ENCOUNTER — HOSPITAL ENCOUNTER (OUTPATIENT)
Facility: HOSPITAL | Age: 56
Discharge: HOME | End: 2021-02-05
Attending: EMERGENCY MEDICINE | Admitting: INTERNAL MEDICINE

## 2021-02-01 ENCOUNTER — APPOINTMENT (OUTPATIENT)
Dept: GENERAL RADIOLOGY | Facility: HOSPITAL | Age: 56
End: 2021-02-01

## 2021-02-01 ENCOUNTER — APPOINTMENT (OUTPATIENT)
Dept: CT IMAGING | Facility: HOSPITAL | Age: 56
End: 2021-02-01

## 2021-02-01 DIAGNOSIS — R10.13 EPIGASTRIC PAIN: ICD-10-CM

## 2021-02-01 DIAGNOSIS — I71.40 ABDOMINAL ANEURYSM (HCC): ICD-10-CM

## 2021-02-01 DIAGNOSIS — R10.9 ACUTE ABDOMINAL PAIN: ICD-10-CM

## 2021-02-01 DIAGNOSIS — R07.9 ACUTE CHEST PAIN: Primary | ICD-10-CM

## 2021-02-01 LAB
ALBUMIN SERPL-MCNC: 4.2 G/DL (ref 3.5–5.2)
ALBUMIN/GLOB SERPL: 1.7 G/DL
ALP SERPL-CCNC: 74 U/L (ref 39–117)
ALT SERPL W P-5'-P-CCNC: 23 U/L (ref 1–41)
ANION GAP SERPL CALCULATED.3IONS-SCNC: 9 MMOL/L (ref 5–15)
AST SERPL-CCNC: 23 U/L (ref 1–40)
BASOPHILS # BLD AUTO: 0.08 10*3/MM3 (ref 0–0.2)
BASOPHILS NFR BLD AUTO: 1 % (ref 0–1.5)
BILIRUB SERPL-MCNC: 0.3 MG/DL (ref 0–1.2)
BUN SERPL-MCNC: 13 MG/DL (ref 6–20)
BUN/CREAT SERPL: 16 (ref 7–25)
CALCIUM SPEC-SCNC: 9.3 MG/DL (ref 8.6–10.5)
CHLORIDE SERPL-SCNC: 105 MMOL/L (ref 98–107)
CO2 SERPL-SCNC: 29 MMOL/L (ref 22–29)
CREAT SERPL-MCNC: 0.81 MG/DL (ref 0.76–1.27)
D-LACTATE SERPL-SCNC: 0.6 MMOL/L (ref 0.5–2)
DEPRECATED RDW RBC AUTO: 49.7 FL (ref 37–54)
EOSINOPHIL # BLD AUTO: 0.42 10*3/MM3 (ref 0–0.4)
EOSINOPHIL NFR BLD AUTO: 5.3 % (ref 0.3–6.2)
ERYTHROCYTE [DISTWIDTH] IN BLOOD BY AUTOMATED COUNT: 14.3 % (ref 12.3–15.4)
FLUAV RNA RESP QL NAA+PROBE: NOT DETECTED
FLUBV RNA RESP QL NAA+PROBE: NOT DETECTED
GFR SERPL CREATININE-BSD FRML MDRD: 98 ML/MIN/1.73
GLOBULIN UR ELPH-MCNC: 2.5 GM/DL
GLUCOSE SERPL-MCNC: 119 MG/DL (ref 65–99)
HCT VFR BLD AUTO: 39.6 % (ref 37.5–51)
HGB BLD-MCNC: 13.2 G/DL (ref 13–17.7)
HOLD SPECIMEN: NORMAL
HOLD SPECIMEN: NORMAL
IMM GRANULOCYTES # BLD AUTO: 0.06 10*3/MM3 (ref 0–0.05)
IMM GRANULOCYTES NFR BLD AUTO: 0.8 % (ref 0–0.5)
INR PPP: 0.95 (ref 0.85–1.16)
LIPASE SERPL-CCNC: 8 U/L (ref 13–60)
LYMPHOCYTES # BLD AUTO: 2.01 10*3/MM3 (ref 0.7–3.1)
LYMPHOCYTES NFR BLD AUTO: 25.3 % (ref 19.6–45.3)
MCH RBC QN AUTO: 31.6 PG (ref 26.6–33)
MCHC RBC AUTO-ENTMCNC: 33.3 G/DL (ref 31.5–35.7)
MCV RBC AUTO: 94.7 FL (ref 79–97)
MONOCYTES # BLD AUTO: 0.66 10*3/MM3 (ref 0.1–0.9)
MONOCYTES NFR BLD AUTO: 8.3 % (ref 5–12)
NEUTROPHILS NFR BLD AUTO: 4.72 10*3/MM3 (ref 1.7–7)
NEUTROPHILS NFR BLD AUTO: 59.3 % (ref 42.7–76)
NRBC BLD AUTO-RTO: 0 /100 WBC (ref 0–0.2)
NT-PROBNP SERPL-MCNC: 23.4 PG/ML (ref 0–900)
PLATELET # BLD AUTO: 210 10*3/MM3 (ref 140–450)
PMV BLD AUTO: 10.6 FL (ref 6–12)
POTASSIUM SERPL-SCNC: 4 MMOL/L (ref 3.5–5.2)
PROT SERPL-MCNC: 6.7 G/DL (ref 6–8.5)
PROTHROMBIN TIME: 12.4 SECONDS (ref 11.5–14)
QT INTERVAL: 402 MS
QTC INTERVAL: 434 MS
RBC # BLD AUTO: 4.18 10*6/MM3 (ref 4.14–5.8)
SARS-COV-2 RNA RESP QL NAA+PROBE: NOT DETECTED
SODIUM SERPL-SCNC: 143 MMOL/L (ref 136–145)
TROPONIN T SERPL-MCNC: <0.01 NG/ML (ref 0–0.03)
WBC # BLD AUTO: 7.95 10*3/MM3 (ref 3.4–10.8)
WHOLE BLOOD HOLD SPECIMEN: NORMAL
WHOLE BLOOD HOLD SPECIMEN: NORMAL

## 2021-02-01 PROCEDURE — 71045 X-RAY EXAM CHEST 1 VIEW: CPT

## 2021-02-01 PROCEDURE — 0 IOPAMIDOL PER 1 ML: Performed by: EMERGENCY MEDICINE

## 2021-02-01 PROCEDURE — 85610 PROTHROMBIN TIME: CPT

## 2021-02-01 PROCEDURE — 25010000002 ONDANSETRON PER 1 MG: Performed by: EMERGENCY MEDICINE

## 2021-02-01 PROCEDURE — 96372 THER/PROPH/DIAG INJ SC/IM: CPT

## 2021-02-01 PROCEDURE — 83690 ASSAY OF LIPASE: CPT

## 2021-02-01 PROCEDURE — 25010000002 ENOXAPARIN PER 10 MG: Performed by: NURSE PRACTITIONER

## 2021-02-01 PROCEDURE — C9803 HOPD COVID-19 SPEC COLLECT: HCPCS

## 2021-02-01 PROCEDURE — 94799 UNLISTED PULMONARY SVC/PX: CPT

## 2021-02-01 PROCEDURE — G0378 HOSPITAL OBSERVATION PER HR: HCPCS

## 2021-02-01 PROCEDURE — 96376 TX/PRO/DX INJ SAME DRUG ADON: CPT

## 2021-02-01 PROCEDURE — 96374 THER/PROPH/DIAG INJ IV PUSH: CPT

## 2021-02-01 PROCEDURE — 84484 ASSAY OF TROPONIN QUANT: CPT

## 2021-02-01 PROCEDURE — 85025 COMPLETE CBC W/AUTO DIFF WBC: CPT

## 2021-02-01 PROCEDURE — 71275 CT ANGIOGRAPHY CHEST: CPT

## 2021-02-01 PROCEDURE — 83880 ASSAY OF NATRIURETIC PEPTIDE: CPT

## 2021-02-01 PROCEDURE — 25010000002 HYDROMORPHONE PER 4 MG: Performed by: EMERGENCY MEDICINE

## 2021-02-01 PROCEDURE — 96375 TX/PRO/DX INJ NEW DRUG ADDON: CPT

## 2021-02-01 PROCEDURE — 74177 CT ABD & PELVIS W/CONTRAST: CPT

## 2021-02-01 PROCEDURE — 80053 COMPREHEN METABOLIC PANEL: CPT

## 2021-02-01 PROCEDURE — 99284 EMERGENCY DEPT VISIT MOD MDM: CPT

## 2021-02-01 PROCEDURE — 93005 ELECTROCARDIOGRAM TRACING: CPT

## 2021-02-01 PROCEDURE — 84484 ASSAY OF TROPONIN QUANT: CPT | Performed by: FAMILY MEDICINE

## 2021-02-01 PROCEDURE — 87636 SARSCOV2 & INF A&B AMP PRB: CPT | Performed by: NURSE PRACTITIONER

## 2021-02-01 PROCEDURE — 94640 AIRWAY INHALATION TREATMENT: CPT

## 2021-02-01 PROCEDURE — 99220 PR INITIAL OBSERVATION CARE/DAY 70 MINUTES: CPT | Performed by: NURSE PRACTITIONER

## 2021-02-01 PROCEDURE — 83605 ASSAY OF LACTIC ACID: CPT | Performed by: NURSE PRACTITIONER

## 2021-02-01 RX ORDER — METHOCARBAMOL 500 MG/1
750 TABLET, FILM COATED ORAL 4 TIMES DAILY PRN
Status: DISCONTINUED | OUTPATIENT
Start: 2021-02-01 | End: 2021-02-05 | Stop reason: HOSPADM

## 2021-02-01 RX ORDER — HYDROMORPHONE HYDROCHLORIDE 1 MG/ML
0.5 INJECTION, SOLUTION INTRAMUSCULAR; INTRAVENOUS; SUBCUTANEOUS ONCE
Status: COMPLETED | OUTPATIENT
Start: 2021-02-01 | End: 2021-02-01

## 2021-02-01 RX ORDER — ATORVASTATIN CALCIUM 40 MG/1
40 TABLET, FILM COATED ORAL DAILY
Status: DISCONTINUED | OUTPATIENT
Start: 2021-02-02 | End: 2021-02-05 | Stop reason: HOSPADM

## 2021-02-01 RX ORDER — PANTOPRAZOLE SODIUM 40 MG/10ML
40 INJECTION, POWDER, LYOPHILIZED, FOR SOLUTION INTRAVENOUS
Status: DISCONTINUED | OUTPATIENT
Start: 2021-02-02 | End: 2021-02-03

## 2021-02-01 RX ORDER — SODIUM CHLORIDE 0.9 % (FLUSH) 0.9 %
10 SYRINGE (ML) INJECTION EVERY 12 HOURS SCHEDULED
Status: DISCONTINUED | OUTPATIENT
Start: 2021-02-01 | End: 2021-02-05 | Stop reason: HOSPADM

## 2021-02-01 RX ORDER — ASPIRIN 81 MG/1
324 TABLET, CHEWABLE ORAL ONCE
Status: COMPLETED | OUTPATIENT
Start: 2021-02-01 | End: 2021-02-01

## 2021-02-01 RX ORDER — CITALOPRAM 40 MG/1
40 TABLET ORAL DAILY
Status: DISCONTINUED | OUTPATIENT
Start: 2021-02-02 | End: 2021-02-05 | Stop reason: HOSPADM

## 2021-02-01 RX ORDER — ONDANSETRON 2 MG/ML
4 INJECTION INTRAMUSCULAR; INTRAVENOUS ONCE
Status: COMPLETED | OUTPATIENT
Start: 2021-02-01 | End: 2021-02-01

## 2021-02-01 RX ORDER — BUDESONIDE AND FORMOTEROL FUMARATE DIHYDRATE 80; 4.5 UG/1; UG/1
2 AEROSOL RESPIRATORY (INHALATION)
Status: DISCONTINUED | OUTPATIENT
Start: 2021-02-01 | End: 2021-02-05 | Stop reason: HOSPADM

## 2021-02-01 RX ORDER — SODIUM CHLORIDE 0.9 % (FLUSH) 0.9 %
10 SYRINGE (ML) INJECTION AS NEEDED
Status: DISCONTINUED | OUTPATIENT
Start: 2021-02-01 | End: 2021-02-05 | Stop reason: HOSPADM

## 2021-02-01 RX ORDER — BUPRENORPHINE HYDROCHLORIDE AND NALOXONE HYDROCHLORIDE DIHYDRATE 8; 2 MG/1; MG/1
1 TABLET SUBLINGUAL DAILY
Status: DISCONTINUED | OUTPATIENT
Start: 2021-02-02 | End: 2021-02-02

## 2021-02-01 RX ORDER — ASPIRIN 81 MG/1
81 TABLET ORAL DAILY
Status: DISCONTINUED | OUTPATIENT
Start: 2021-02-02 | End: 2021-02-05 | Stop reason: HOSPADM

## 2021-02-01 RX ORDER — CLOPIDOGREL BISULFATE 75 MG/1
75 TABLET ORAL DAILY
Status: DISCONTINUED | OUTPATIENT
Start: 2021-02-02 | End: 2021-02-05 | Stop reason: HOSPADM

## 2021-02-01 RX ORDER — TERAZOSIN 5 MG/1
5 CAPSULE ORAL NIGHTLY
Status: DISCONTINUED | OUTPATIENT
Start: 2021-02-01 | End: 2021-02-05 | Stop reason: HOSPADM

## 2021-02-01 RX ADMIN — METHOCARBAMOL 750 MG: 500 TABLET, FILM COATED ORAL at 22:58

## 2021-02-01 RX ADMIN — ENOXAPARIN SODIUM 40 MG: 40 INJECTION SUBCUTANEOUS at 21:46

## 2021-02-01 RX ADMIN — ONDANSETRON 4 MG: 2 INJECTION INTRAMUSCULAR; INTRAVENOUS at 14:26

## 2021-02-01 RX ADMIN — ASPIRIN 324 MG: 81 TABLET, CHEWABLE ORAL at 20:38

## 2021-02-01 RX ADMIN — IOPAMIDOL 100 ML: 755 INJECTION, SOLUTION INTRAVENOUS at 15:13

## 2021-02-01 RX ADMIN — BUDESONIDE AND FORMOTEROL FUMARATE DIHYDRATE 2 PUFF: 80; 4.5 AEROSOL RESPIRATORY (INHALATION) at 23:40

## 2021-02-01 RX ADMIN — HYDROMORPHONE HYDROCHLORIDE 0.5 MG: 1 INJECTION, SOLUTION INTRAMUSCULAR; INTRAVENOUS; SUBCUTANEOUS at 17:47

## 2021-02-01 RX ADMIN — TERAZOSIN HYDROCHLORIDE 5 MG: 5 CAPSULE ORAL at 21:46

## 2021-02-01 RX ADMIN — LIDOCAINE HYDROCHLORIDE: 20 SOLUTION ORAL; TOPICAL at 21:46

## 2021-02-01 RX ADMIN — SODIUM CHLORIDE, PRESERVATIVE FREE 10 ML: 5 INJECTION INTRAVENOUS at 21:46

## 2021-02-01 RX ADMIN — HYDROMORPHONE HYDROCHLORIDE 0.5 MG: 1 INJECTION, SOLUTION INTRAMUSCULAR; INTRAVENOUS; SUBCUTANEOUS at 14:26

## 2021-02-01 NOTE — ED PROVIDER NOTES
Subjective   Patient presents to the hospital for chest pain and upper abdominal pain off and on for last 2 weeks.  He does have a history of heart disease with a stent he tells me he is most recent heart cath with 5 years ago at [facility].  He did have a stress test several years ago here.  Tells me the pain is somewhat worse with exertion and deep breathing.  Is also worse with movement particular in his abdomen.  He tells me has a history of abdominal aneurysm he is unsure the size but he tells me they are watching it.  He tells me this was found on a CAT scan probably at [facility].  He tells me he called his insurance company and advised him to come to the hospital for further evaluation.  He denies any fever.  He tells me he has been constipated for last 2 weeks and has to take a medication help him have a bowel movement.      Abdominal Pain  Pain location:  RUQ and epigastric  Pain quality: aching and cramping    Pain radiates to:  Chest  Pain severity:  Moderate  Onset quality:  Gradual  Duration:  2 weeks  Timing:  Intermittent  Progression:  Waxing and waning  Chronicity:  New  Relieved by:  Nothing  Worsened by:  Movement and deep breathing  Associated symptoms: chest pain, constipation and shortness of breath    Associated symptoms: no chills, no cough, no diarrhea, no dysuria, no fever, no hematuria, no nausea, no sore throat and no vomiting    Risk factors: no alcohol abuse        Review of Systems   Constitutional: Negative for chills, diaphoresis and fever.   HENT: Negative for congestion and sore throat.    Respiratory: Positive for shortness of breath. Negative for cough, choking, chest tightness and wheezing.    Cardiovascular: Positive for chest pain. Negative for leg swelling.   Gastrointestinal: Positive for abdominal pain and constipation. Negative for abdominal distention, anal bleeding, blood in stool, diarrhea, nausea and vomiting.   Genitourinary: Negative for difficulty urinating,  dysuria, flank pain, frequency, hematuria and urgency.   All other systems reviewed and are negative.      Past Medical History:   Diagnosis Date   • Asthma    • GERD (gastroesophageal reflux disease)    • Hepatitis C    • Hyperlipidemia    • Hypertension    • Myocardial infarction (CMS/HCC)        Allergies   Allergen Reactions   • Codeine Nausea And Vomiting       Past Surgical History:   Procedure Laterality Date   • CARDIAC CATHETERIZATION N/A    • CORONARY ANGIOPLASTY WITH STENT PLACEMENT N/A        Family History   Problem Relation Age of Onset   • Heart disease Father    • Aneurysm Mother    • Arthritis Mother        Social History     Socioeconomic History   • Marital status:      Spouse name: Not on file   • Number of children: Not on file   • Years of education: Not on file   • Highest education level: Not on file   Tobacco Use   • Smoking status: Current Every Day Smoker     Packs/day: 0.50   • Smokeless tobacco: Never Used   Substance and Sexual Activity   • Alcohol use: No     Comment: Quit in 2007   • Drug use: No     Comment: 2/3/2017   • Sexual activity: Defer           Objective   Physical Exam  Constitutional:       Appearance: He is well-developed.   HENT:      Head: Normocephalic and atraumatic.      Right Ear: External ear normal.      Left Ear: External ear normal.      Nose: Nose normal.   Eyes:      Conjunctiva/sclera: Conjunctivae normal.      Pupils: Pupils are equal, round, and reactive to light.   Neck:      Musculoskeletal: Normal range of motion and neck supple.   Cardiovascular:      Rate and Rhythm: Normal rate and regular rhythm.      Heart sounds: Normal heart sounds.   Pulmonary:      Effort: Pulmonary effort is normal.      Breath sounds: Normal breath sounds.   Abdominal:      General: Bowel sounds are normal. There is distension.      Palpations: Abdomen is soft.      Tenderness: There is abdominal tenderness.   Musculoskeletal: Normal range of motion.   Skin:      General: Skin is warm and dry.   Neurological:      Mental Status: He is alert and oriented to person, place, and time.   Psychiatric:         Behavior: Behavior normal.         Judgment: Judgment normal.         Procedures           ED Course  ED Course as of Feb 01 1825   Mon Feb 01, 2021   1748 I have paged the hospitalist for admission.  The patient is still having some abdominal chest pain.  Patient has had 2 - troponins and reassuring EKG however still with continued pain will need to bring in for further evaluation.    []   1825 I spoke with Dr. Matamoros and she will admit    [JM]      ED Course User Index  [ULICES] Pete Junior APRN           CT Angiogram Chest   Final Result   No pulmonary embolus or other acute infectious/inflammatory   process in the chest. Fairly stable background emphysema changes.           This report was finalized on 2/1/2021 3:59 PM by Eduardo Harris.          CT Abdomen Pelvis With Contrast   Final Result   4.8 cm infrarenal abdominal aortic aneurysm. In the absence   of comparison, evaluation for acute enlargement is limited, however   there is no surrounding periaortic edema currently. There is short   segment severe atherosclerotic narrowing or occlusion of the left common   iliac artery, with reconstitution of the internal and external branches.       No acute findings in the abdomen and pelvis otherwise.           This report was finalized on 2/1/2021 4:13 PM by Eduardo Harris.          XR Chest 1 View   Final Result   Mild chronic changes without evidence of acute disease in   the chest.       This report was finalized on 2/1/2021 3:08 PM by Eduardo Harris.                                            MDM    Final diagnoses:   Acute chest pain   Acute abdominal pain   Abdominal aneurysm (CMS/HCC)            Pete Junior APRN  02/01/21 [id number]

## 2021-02-02 ENCOUNTER — ANESTHESIA (OUTPATIENT)
Dept: GASTROENTEROLOGY | Facility: HOSPITAL | Age: 56
End: 2021-02-02

## 2021-02-02 ENCOUNTER — ANESTHESIA EVENT (OUTPATIENT)
Dept: GASTROENTEROLOGY | Facility: HOSPITAL | Age: 56
End: 2021-02-02

## 2021-02-02 LAB
ANION GAP SERPL CALCULATED.3IONS-SCNC: 10 MMOL/L (ref 5–15)
BASOPHILS # BLD AUTO: 0.07 10*3/MM3 (ref 0–0.2)
BASOPHILS NFR BLD AUTO: 1 % (ref 0–1.5)
BUN SERPL-MCNC: 12 MG/DL (ref 6–20)
BUN/CREAT SERPL: 15.2 (ref 7–25)
CALCIUM SPEC-SCNC: 8.8 MG/DL (ref 8.6–10.5)
CHLORIDE SERPL-SCNC: 104 MMOL/L (ref 98–107)
CO2 SERPL-SCNC: 25 MMOL/L (ref 22–29)
CREAT SERPL-MCNC: 0.79 MG/DL (ref 0.76–1.27)
DEPRECATED RDW RBC AUTO: 48.5 FL (ref 37–54)
EOSINOPHIL # BLD AUTO: 0.33 10*3/MM3 (ref 0–0.4)
EOSINOPHIL NFR BLD AUTO: 4.9 % (ref 0.3–6.2)
ERYTHROCYTE [DISTWIDTH] IN BLOOD BY AUTOMATED COUNT: 14.2 % (ref 12.3–15.4)
GFR SERPL CREATININE-BSD FRML MDRD: 101 ML/MIN/1.73
GLUCOSE SERPL-MCNC: 92 MG/DL (ref 65–99)
HCT VFR BLD AUTO: 35 % (ref 37.5–51)
HGB BLD-MCNC: 11.8 G/DL (ref 13–17.7)
IMM GRANULOCYTES # BLD AUTO: 0.04 10*3/MM3 (ref 0–0.05)
IMM GRANULOCYTES NFR BLD AUTO: 0.6 % (ref 0–0.5)
LYMPHOCYTES # BLD AUTO: 1.8 10*3/MM3 (ref 0.7–3.1)
LYMPHOCYTES NFR BLD AUTO: 26.6 % (ref 19.6–45.3)
MCH RBC QN AUTO: 31.2 PG (ref 26.6–33)
MCHC RBC AUTO-ENTMCNC: 33.7 G/DL (ref 31.5–35.7)
MCV RBC AUTO: 92.6 FL (ref 79–97)
MONOCYTES # BLD AUTO: 0.62 10*3/MM3 (ref 0.1–0.9)
MONOCYTES NFR BLD AUTO: 9.2 % (ref 5–12)
NEUTROPHILS NFR BLD AUTO: 3.91 10*3/MM3 (ref 1.7–7)
NEUTROPHILS NFR BLD AUTO: 57.7 % (ref 42.7–76)
NRBC BLD AUTO-RTO: 0 /100 WBC (ref 0–0.2)
PLATELET # BLD AUTO: 180 10*3/MM3 (ref 140–450)
PMV BLD AUTO: 10.4 FL (ref 6–12)
POTASSIUM SERPL-SCNC: 4.4 MMOL/L (ref 3.5–5.2)
QT INTERVAL: 426 MS
QT INTERVAL: 436 MS
QTC INTERVAL: 421 MS
QTC INTERVAL: 467 MS
RBC # BLD AUTO: 3.78 10*6/MM3 (ref 4.14–5.8)
SODIUM SERPL-SCNC: 139 MMOL/L (ref 136–145)
TROPONIN T SERPL-MCNC: <0.01 NG/ML (ref 0–0.03)
WBC # BLD AUTO: 6.77 10*3/MM3 (ref 3.4–10.8)

## 2021-02-02 PROCEDURE — G0378 HOSPITAL OBSERVATION PER HR: HCPCS

## 2021-02-02 PROCEDURE — 25010000002 METHYLNALTREXONE 12 MG/0.6ML SOLUTION: Performed by: INTERNAL MEDICINE

## 2021-02-02 PROCEDURE — 85025 COMPLETE CBC W/AUTO DIFF WBC: CPT | Performed by: NURSE PRACTITIONER

## 2021-02-02 PROCEDURE — 99203 OFFICE O/P NEW LOW 30 MIN: CPT | Performed by: PHYSICIAN ASSISTANT

## 2021-02-02 PROCEDURE — 43239 EGD BIOPSY SINGLE/MULTIPLE: CPT | Performed by: INTERNAL MEDICINE

## 2021-02-02 PROCEDURE — 99225 PR SBSQ OBSERVATION CARE/DAY 25 MINUTES: CPT | Performed by: INTERNAL MEDICINE

## 2021-02-02 PROCEDURE — 94799 UNLISTED PULMONARY SVC/PX: CPT

## 2021-02-02 PROCEDURE — 88305 TISSUE EXAM BY PATHOLOGIST: CPT | Performed by: INTERNAL MEDICINE

## 2021-02-02 PROCEDURE — 84484 ASSAY OF TROPONIN QUANT: CPT | Performed by: NURSE PRACTITIONER

## 2021-02-02 PROCEDURE — 93010 ELECTROCARDIOGRAM REPORT: CPT | Performed by: INTERNAL MEDICINE

## 2021-02-02 PROCEDURE — 93005 ELECTROCARDIOGRAM TRACING: CPT | Performed by: NURSE PRACTITIONER

## 2021-02-02 PROCEDURE — 80048 BASIC METABOLIC PNL TOTAL CA: CPT | Performed by: NURSE PRACTITIONER

## 2021-02-02 PROCEDURE — 25010000002 PROPOFOL 10 MG/ML EMULSION: Performed by: NURSE ANESTHETIST, CERTIFIED REGISTERED

## 2021-02-02 RX ORDER — SODIUM CHLORIDE 0.9 % (FLUSH) 0.9 %
10 SYRINGE (ML) INJECTION AS NEEDED
Status: DISCONTINUED | OUTPATIENT
Start: 2021-02-02 | End: 2021-02-02 | Stop reason: HOSPADM

## 2021-02-02 RX ORDER — SODIUM CHLORIDE, SODIUM LACTATE, POTASSIUM CHLORIDE, CALCIUM CHLORIDE 600; 310; 30; 20 MG/100ML; MG/100ML; MG/100ML; MG/100ML
9 INJECTION, SOLUTION INTRAVENOUS CONTINUOUS
Status: DISCONTINUED | OUTPATIENT
Start: 2021-02-02 | End: 2021-02-03

## 2021-02-02 RX ORDER — BUPRENORPHINE HYDROCHLORIDE AND NALOXONE HYDROCHLORIDE DIHYDRATE 8; 2 MG/1; MG/1
2 TABLET SUBLINGUAL DAILY
Status: DISCONTINUED | OUTPATIENT
Start: 2021-02-03 | End: 2021-02-04

## 2021-02-02 RX ORDER — MIDAZOLAM HYDROCHLORIDE 1 MG/ML
2 INJECTION INTRAMUSCULAR; INTRAVENOUS
Status: DISCONTINUED | OUTPATIENT
Start: 2021-02-02 | End: 2021-02-02 | Stop reason: HOSPADM

## 2021-02-02 RX ORDER — PROPOFOL 10 MG/ML
VIAL (ML) INTRAVENOUS AS NEEDED
Status: DISCONTINUED | OUTPATIENT
Start: 2021-02-02 | End: 2021-02-02 | Stop reason: SURG

## 2021-02-02 RX ORDER — LIDOCAINE HYDROCHLORIDE 10 MG/ML
INJECTION, SOLUTION EPIDURAL; INFILTRATION; INTRACAUDAL; PERINEURAL AS NEEDED
Status: DISCONTINUED | OUTPATIENT
Start: 2021-02-02 | End: 2021-02-02 | Stop reason: SURG

## 2021-02-02 RX ORDER — EPHEDRINE SULFATE 50 MG/ML
INJECTION, SOLUTION INTRAVENOUS AS NEEDED
Status: DISCONTINUED | OUTPATIENT
Start: 2021-02-02 | End: 2021-02-02 | Stop reason: SURG

## 2021-02-02 RX ORDER — LORAZEPAM 1 MG/1
1 TABLET ORAL ONCE
Status: DISCONTINUED | OUTPATIENT
Start: 2021-02-02 | End: 2021-02-05 | Stop reason: HOSPADM

## 2021-02-02 RX ORDER — POLYETHYLENE GLYCOL 3350 17 G/17G
17 POWDER, FOR SOLUTION ORAL DAILY
Status: DISCONTINUED | OUTPATIENT
Start: 2021-02-02 | End: 2021-02-05 | Stop reason: HOSPADM

## 2021-02-02 RX ORDER — SODIUM CHLORIDE 0.9 % (FLUSH) 0.9 %
10 SYRINGE (ML) INJECTION EVERY 12 HOURS SCHEDULED
Status: DISCONTINUED | OUTPATIENT
Start: 2021-02-02 | End: 2021-02-02 | Stop reason: HOSPADM

## 2021-02-02 RX ORDER — LIDOCAINE HYDROCHLORIDE 10 MG/ML
0.5 INJECTION, SOLUTION EPIDURAL; INFILTRATION; INTRACAUDAL; PERINEURAL ONCE AS NEEDED
Status: DISCONTINUED | OUTPATIENT
Start: 2021-02-02 | End: 2021-02-02 | Stop reason: HOSPADM

## 2021-02-02 RX ORDER — FAMOTIDINE 20 MG/1
20 TABLET, FILM COATED ORAL ONCE
Status: DISCONTINUED | OUTPATIENT
Start: 2021-02-02 | End: 2021-02-02 | Stop reason: HOSPADM

## 2021-02-02 RX ORDER — FAMOTIDINE 10 MG/ML
20 INJECTION, SOLUTION INTRAVENOUS ONCE
Status: DISCONTINUED | OUTPATIENT
Start: 2021-02-02 | End: 2021-02-02 | Stop reason: HOSPADM

## 2021-02-02 RX ORDER — BUPRENORPHINE HYDROCHLORIDE AND NALOXONE HYDROCHLORIDE DIHYDRATE 8; 2 MG/1; MG/1
1 TABLET SUBLINGUAL ONCE
Status: COMPLETED | OUTPATIENT
Start: 2021-02-02 | End: 2021-02-02

## 2021-02-02 RX ORDER — MIDAZOLAM HYDROCHLORIDE 1 MG/ML
1 INJECTION INTRAMUSCULAR; INTRAVENOUS
Status: DISCONTINUED | OUTPATIENT
Start: 2021-02-02 | End: 2021-02-02 | Stop reason: HOSPADM

## 2021-02-02 RX ORDER — ONDANSETRON 2 MG/ML
4 INJECTION INTRAMUSCULAR; INTRAVENOUS ONCE AS NEEDED
Status: DISCONTINUED | OUTPATIENT
Start: 2021-02-02 | End: 2021-02-02 | Stop reason: HOSPADM

## 2021-02-02 RX ADMIN — BUPRENORPHINE AND NALOXONE 1 TABLET: 8; 2 TABLET SUBLINGUAL at 08:19

## 2021-02-02 RX ADMIN — SODIUM CHLORIDE, PRESERVATIVE FREE 10 ML: 5 INJECTION INTRAVENOUS at 08:19

## 2021-02-02 RX ADMIN — EPHEDRINE SULFATE 10 MG: 50 INJECTION, SOLUTION INTRAVENOUS at 12:47

## 2021-02-02 RX ADMIN — ASPIRIN 81 MG: 81 TABLET, COATED ORAL at 08:19

## 2021-02-02 RX ADMIN — LIDOCAINE HYDROCHLORIDE 100 MG: 10 INJECTION, SOLUTION EPIDURAL; INFILTRATION; INTRACAUDAL; PERINEURAL at 12:45

## 2021-02-02 RX ADMIN — ATORVASTATIN CALCIUM 40 MG: 40 TABLET, FILM COATED ORAL at 08:19

## 2021-02-02 RX ADMIN — BUDESONIDE AND FORMOTEROL FUMARATE DIHYDRATE 2 PUFF: 80; 4.5 AEROSOL RESPIRATORY (INHALATION) at 11:36

## 2021-02-02 RX ADMIN — CITALOPRAM HYDROBROMIDE 40 MG: 40 TABLET ORAL at 08:19

## 2021-02-02 RX ADMIN — METHYLNALTREXONE BROMIDE 12 MG: 12 INJECTION, SOLUTION SUBCUTANEOUS at 14:47

## 2021-02-02 RX ADMIN — POLYETHYLENE GLYCOL 3350 17 G: 17 POWDER, FOR SOLUTION ORAL at 14:21

## 2021-02-02 RX ADMIN — BUPRENORPHINE AND NALOXONE 1 TABLET: 8; 2 TABLET SUBLINGUAL at 10:29

## 2021-02-02 RX ADMIN — SODIUM CHLORIDE, PRESERVATIVE FREE 10 ML: 5 INJECTION INTRAVENOUS at 20:59

## 2021-02-02 RX ADMIN — PANTOPRAZOLE SODIUM 40 MG: 40 INJECTION, POWDER, FOR SOLUTION INTRAVENOUS at 05:21

## 2021-02-02 RX ADMIN — BUDESONIDE AND FORMOTEROL FUMARATE DIHYDRATE 2 PUFF: 80; 4.5 AEROSOL RESPIRATORY (INHALATION) at 19:32

## 2021-02-02 RX ADMIN — SODIUM CHLORIDE, POTASSIUM CHLORIDE, SODIUM LACTATE AND CALCIUM CHLORIDE 9 ML/HR: 600; 310; 30; 20 INJECTION, SOLUTION INTRAVENOUS at 12:27

## 2021-02-02 RX ADMIN — PROPOFOL 100 MG: 10 INJECTION, EMULSION INTRAVENOUS at 12:45

## 2021-02-02 RX ADMIN — PROPOFOL 100 MCG/KG/MIN: 10 INJECTION, EMULSION INTRAVENOUS at 12:45

## 2021-02-02 RX ADMIN — CLOPIDOGREL BISULFATE 75 MG: 75 TABLET ORAL at 08:19

## 2021-02-02 NOTE — PROGRESS NOTES
[facility] Medicine Services  PROGRESS NOTE    Patient Name: John Chávez  : 1965  MRN: [id number]    Date of Admission: 2021  Primary Care Physician: Eduardo Jiménez MD    Subjective   Subjective     CC:  abd pain    HPI:  Very uncomfortable, has not yet had BM.  EGD earlier.  Asking for pain meds.  Pain is both lower and upper abd.     ROS:  No fevers  No chest pain or dyspnea    Objective   Objective     Vital Signs:   Temp:  [97.7 °F (36.5 °C)-98 °F (36.7 °C)] 98 °F (36.7 °C)  Heart Rate:  [59-73] 59  Resp:  [18] 18  BP: ()/() 112/66        Physical Exam:  Gen:  WD/WN man, holding his abd and grimacing.   Neuro: alert and oriented, clear speech, follows commands, grossly nonfocal  HEENT:  NC/AT PERRL, OP benign  Neck:  Supple, no LAD  Heart RRR no murmur, rub, or gallop  Lungs CTA nonlabored on RA   Abd:  A bit firm, diffuse mod tender, no rebound or guarding, pos BS  Extrem:  No c/c/e      Results Reviewed:  Results from last 7 days   Lab Units 21  0522 21  1403   WBC 10*3/mm3 6.77 7.95   HEMOGLOBIN g/dL 11.8* 13.2   HEMATOCRIT % 35.0* 39.6   PLATELETS 10*3/mm3 180 210   INR   --  0.95     Results from last 7 days   Lab Units 21  0522 21  2108 21  1552 21  1403   SODIUM mmol/L 139  --   --  143   POTASSIUM mmol/L 4.4  --   --  4.0   CHLORIDE mmol/L 104  --   --  105   CO2 mmol/L 25.0  --   --  29.0   BUN mg/dL 12  --   --  13   CREATININE mg/dL 0.79  --   --  0.81   GLUCOSE mg/dL 92  --   --  119*   CALCIUM mg/dL 8.8  --   --  9.3   ALT (SGPT) U/L  --   --   --  23   AST (SGOT) U/L  --   --   --  23   TROPONIN T ng/mL <0.010 <0.010 <0.010 <0.010   PROBNP pg/mL  --   --   --  23.4     Estimated Creatinine Clearance: 119.4 mL/min (by C-G formula based on SCr of 0.79 mg/dL).    Microbiology Results Abnormal     Procedure Component Value - Date/Time    COVID PRE-OP / PRE-PROCEDURE SCREENING ORDER (NO ISOLATION) - Swab,  Nasopharynx [[id number]]  (Normal) Collected: 02/01/21 1809    Lab Status: Final result Specimen: Swab from Nasopharynx Updated: 02/01/21 1909    Narrative:      The following orders were created for panel order COVID PRE-OP / PRE-PROCEDURE SCREENING ORDER (NO ISOLATION) - Swab, Nasopharynx.  Procedure                               Abnormality         Status                     ---------                               -----------         ------                     COVID-19 and FLU A/B PCR...[[id number]]  Normal              Final result                 Please view results for these tests on the individual orders.    COVID-19 and FLU A/B PCR - Swab, Nasopharynx [[id number]]  (Normal) Collected: 02/01/21 1809    Lab Status: Final result Specimen: Swab from Nasopharynx Updated: 02/01/21 1909     COVID19 Not Detected     Influenza A PCR Not Detected     Influenza B PCR Not Detected    Narrative:      Fact sheet for providers: https://www.fda.gov/media/901031/download    Fact sheet for patients: https://www.fda.gov/media/230431/download    Test performed by PCR.          Imaging Results (Last 24 Hours)     Procedure Component Value Units Date/Time    CT Abdomen Pelvis With Contrast [[id number]] Collected: 02/01/21 1607     Updated: 02/01/21 1616    Narrative:      EXAMINATION: CT ABDOMEN PELVIS W CONTRAST-      INDICATION: right sided abd pain. hx of aneurysm?     TECHNIQUE: Axial IV contrast-enhanced CT of the abdomen and pelvis with  multiplanar reconstruction     The radiation dose reduction device was turned on for each scan per the  ALARA (As Low as Reasonably Achievable) protocol.     COMPARISON: NONE     FINDINGS: The lung bases are grossly clear. Body wall soft tissues are  unremarkable. The liver, spleen, pancreas and bilateral adrenal glands  demonstrate homogeneous attenuation without evidence of suspicious  lesion. A stable left adrenal nodule from 2017 demonstrates  characteristics most compatible with benign  adenoma. Small and large  bowel loops are nondilated. There is no suspicious focal bowel wall  thickening. The appendix is normal. Unremarkable gallbladder. No free  fluid or pneumoperitoneum. The kidneys demonstrate symmetric nephrogram  and contrast excretion without evidence of hydronephrosis or contour  deforming mass. The pelvic viscera are unremarkable. There is no  evidence of acute fracture or aggressive osseous lesion. There is  aneurysmal dilatation of the infrarenal abdominal aorta, with  significant circumferential component of intraluminal thrombus and/or  soft plaque, with maximal dimension 4.8 cm. There is short segment  severe atherosclerotic narrowing or occlusion of the left common iliac  artery, with reconstitution of the external and internal branches. The  visceral branches of the abdominal aorta including the celiac, superior  mesenteric and bilateral renal artery origins appear grossly patent.  There is no definite adjacent acute edema or other inflammatory signs of  impending rupture.       Impression:      4.8 cm infrarenal abdominal aortic aneurysm. In the absence  of comparison, evaluation for acute enlargement is limited, however  there is no surrounding periaortic edema currently. There is short  segment severe atherosclerotic narrowing or occlusion of the left common  iliac artery, with reconstitution of the internal and external branches.     No acute findings in the abdomen and pelvis otherwise.        This report was finalized on 2/1/2021 4:13 PM by Eduardo Harris.       CT Angiogram Chest [[id number]] Collected: 02/01/21 1554     Updated: 02/01/21 1602    Narrative:      EXAMINATION: CT ANGIOGRAM CHEST-      INDICATION: Pulmonary embolus     TECHNIQUE: Axial pulmonary arterial phase IV contrast enhanced CT  angiogram of the chest per PE protocol. Two-dimensional reconstructions  were postprocessed.     The radiation dose reduction device was turned on for each scan per the  ALARA  (As Low as Reasonably Achievable) protocol.     COMPARISON: 2/11/2017     FINDINGS: No pathologic axillary adenopathy or other worrisome body wall  soft tissue finding in the chest. No acute findings in the partially  imaged upper abdomen. No evidence of acute fracture or aggressive  osseous lesion. Unremarkable thoracic esophagus. No pleural or  pericardial effusion. Normal caliber mildly atherosclerotic thoracic  aorta. The pulmonary arteries are well-opacified and without evidence of  filling defect concerning for acute pulmonary embolus. Evaluation of the  lung fields demonstrates background centrilobular emphysema changes,  otherwise without evidence of superimposed acute infectious or  inflammatory process. There are no suspicious pulmonary nodules.       Impression:      No pulmonary embolus or other acute infectious/inflammatory  process in the chest. Fairly stable background emphysema changes.        This report was finalized on 2/1/2021 3:59 PM by Eduardo Harris.       XR Chest 1 View [[id number]] Collected: 02/01/21 1507     Updated: 02/01/21 1511    Narrative:      EXAMINATION: XR CHEST 1 VW-      INDICATION: Chest Pain triage protocol      COMPARISON: 2/11/2017     FINDINGS: Mild chronic interstitial changes are present, without focal  consolidation. No significant pleural effusion or distinct pneumothorax.  Normal heart and mediastinal contours.       Impression:      Mild chronic changes without evidence of acute disease in  the chest.     This report was finalized on 2/1/2021 3:08 PM by Eduardo Harris.             Results for orders placed during the hospital encounter of 07/24/16   Adult stress echo only    Narrative · Patient C/O chest pain 7/10 at baseline; this increased to a 10/10 with   exercise; patient given one sublingual spray of nitroglycerin; 4 minutes   into recovery his CP was back to baseline.  · BMi = 25.1; ЕКАТЕРИНА = (+18); expected exercise endurance = 10:10; actual   =8:22.  · No  significant ST or T wave changes with exercise.  · Abnormal acceptable nondiagnostic echocardiographic GXT with exersise to   only 57% PMHR and 82% predicted exercise capacity.          I have reviewed the medications:  Scheduled Meds:aspirin, 81 mg, Oral, Daily  atorvastatin, 40 mg, Oral, Daily  budesonide-formoterol, 2 puff, Inhalation, BID - RT  buprenorphine-naloxone, 1 tablet, Sublingual, Daily  citalopram, 40 mg, Oral, Daily  clopidogrel, 75 mg, Oral, Daily  enoxaparin, 40 mg, Subcutaneous, Q24H  pantoprazole, 40 mg, Intravenous, Q AM  sodium chloride, 10 mL, Intravenous, Q12H  terazosin, 5 mg, Oral, Nightly      Continuous Infusions:   PRN Meds:.methocarbamol  •  sodium chloride  •  sodium chloride    Assessment/Plan   Assessment & Plan     Active Hospital Problems    Diagnosis  POA   • **Epigastric pain [R10.13]  Yes   • Acute chest pain [R07.9]  Yes   • AAA (abdominal aortic aneurysm) (CMS/HCC) [I71.4]  Yes   • Coronary artery disease involving native coronary artery with hx of MI and PCI [I25.10]  Yes   • Hyperlipidemia [E78.5]  Yes   • Hypertension [I10]  Yes   • Tobacco abuse [Z72.0]  Yes   • GERD (gastroesophageal reflux disease) [K21.9]  Yes      Resolved Hospital Problems   No resolved problems to display.        Brief Hospital Course to date:  John Chávez is a 55 y.o. male  with a history of GERD, hepatitis C, hyperlipidemia, hypertension, CAD s/p stents, presents to the ED with complaints of intermittent epigastric pain for the past 2 weeks that worsened over the past 2 days.  Patient also reports constipation for the past 2 weeks and has been taking an over-the-counter laxative daily in order to have a bowel movement.  Last bowel movement was yesterday.  He reports his pain is sharp, and radiates from his epigastric area down his stomach and around to his back.       All problems are new to me.  Chart reviewed and updated.    Plan:     Epigastric abdominal pain  GERD  -- EGD w mild gastritis  only  - treating for constip  - rathert than narcotics, trying ativan for comfort    Chest pain  HTN  HLD  CAD s/p stents  --troponins neg   - Ekg nl       AAA  --CT A/P shows 4.8 cm infrarenal abdominal aortic aneurysm, there is no surrounding periaortic edema currently. There is short segment severe atherosclerotic narrowing or occlusion of the left common iliac artery, with reconstitution of the internal and external branches.   --follow up with CTS outpt  - if pain continues post BM, consider leak/small dissection - watch carefully        DVT Prophylaxis:  [facility]       Disposition: I expect the patient to be discharged tbd    CODE STATUS:   Code Status and Medical Interventions:   Ordered at: 02/01/21 2111     Level Of Support Discussed With:    Patient     Code Status:    CPR     Medical Interventions (Level of Support Prior to Arrest):    Full       Domi Marina MD  02/02/21

## 2021-02-02 NOTE — PLAN OF CARE
Problem: Pain Acute  Goal: Optimal Pain Control  Outcome: Ongoing, Progressing  Intervention: Develop Pain Management Plan  Recent Flowsheet Documentation  Taken 2/1/2021 2059 by June Cardona RN  Pain Management Interventions:   quiet environment facilitated   care clustered  Intervention: Prevent or Manage Pain  Recent Flowsheet Documentation  Taken 2/1/2021 2059 by June Cardona RN  Sensory Stimulation Regulation:   care clustered   lighting decreased  Sleep/Rest Enhancement:   awakenings minimized   consistent schedule promoted   noise level reduced   regular sleep/rest pattern promoted  Intervention: Optimize Psychosocial Wellbeing  Recent Flowsheet Documentation  Taken 2/1/2021 2059 by June Cardona RN  Supportive Measures: active listening utilized  Diversional Activities: television     Problem: Adult Inpatient Plan of Care  Goal: Plan of Care Review  Outcome: Ongoing, Progressing  Goal: Patient-Specific Goal (Individualized)  Outcome: Ongoing, Progressing  Goal: Absence of Hospital-Acquired Illness or Injury  Outcome: Ongoing, Progressing  Intervention: Identify and Manage Fall Risk  Recent Flowsheet Documentation  Taken 2/2/2021 0456 by June Carodna RN  Safety Promotion/Fall Prevention:   activity supervised   assistive device/personal items within reach   clutter free environment maintained   fall prevention program maintained   lighting adjusted   nonskid shoes/slippers when out of bed   room organization consistent   safety round/check completed  Taken 2/2/2021 0200 by June Cardona RN  Safety Promotion/Fall Prevention:   activity supervised   assistive device/personal items within reach   clutter free environment maintained   fall prevention program maintained   lighting adjusted   nonskid shoes/slippers when out of bed   safety round/check completed   room organization consistent  Taken 2/2/2021 0000 by June Cardona RN  Safety Promotion/Fall Prevention:   activity supervised    assistive device/personal items within reach   clutter free environment maintained   fall prevention program maintained   lighting adjusted   nonskid shoes/slippers when out of bed   room organization consistent   safety round/check completed  Taken 2/1/2021 2258 by June Cardona RN  Safety Promotion/Fall Prevention:   activity supervised   assistive device/personal items within reach   clutter free environment maintained   fall prevention program maintained   lighting adjusted   nonskid shoes/slippers when out of bed   room organization consistent   safety round/check completed  Taken 2/1/2021 2059 by June Cardona RN  Safety Promotion/Fall Prevention:   activity supervised   assistive device/personal items within reach   clutter free environment maintained   fall prevention program maintained   lighting adjusted   nonskid shoes/slippers when out of bed   room organization consistent   safety round/check completed  Intervention: Prevent Skin Injury  Recent Flowsheet Documentation  Taken 2/2/2021 0456 by June Cardona RN  Body Position: position changed independently  Taken 2/2/2021 0200 by June Cardona RN  Body Position: position changed independently  Taken 2/2/2021 0000 by June Cardona RN  Body Position: position changed independently  Taken 2/1/2021 2258 by June Cardona RN  Body Position: position changed independently  Taken 2/1/2021 2059 by June Cardona RN  Body Position: position changed independently  Intervention: Prevent and Manage VTE (venous thromboembolism) Risk  Recent Flowsheet Documentation  Taken 2/1/2021 2059 by June Cardona RN  VTE Prevention/Management: (Lovenox)   bilateral   dorsiflexion/plantar flexion performed   other (see comments)  Intervention: Prevent Infection  Recent Flowsheet Documentation  Taken 2/2/2021 0456 by June Cardona RN  Infection Prevention:   rest/sleep promoted   single patient room provided  Taken 2/2/2021 0200 by June Cardona RN  Infection  Prevention:   rest/sleep promoted   single patient room provided  Taken 2/2/2021 0000 by June Cardona RN  Infection Prevention:   rest/sleep promoted   single patient room provided  Taken 2/1/2021 2258 by June Cardona RN  Infection Prevention:   rest/sleep promoted   single patient room provided  Taken 2/1/2021 2059 by June Cardona RN  Infection Prevention:   rest/sleep promoted   single patient room provided  Goal: Optimal Comfort and Wellbeing  Outcome: Ongoing, Progressing  Intervention: Provide Person-Centered Care  Recent Flowsheet Documentation  Taken 2/1/2021 2059 by June Cardona RN  Trust Relationship/Rapport:   care explained   choices provided   emotional support provided   empathic listening provided   questions answered   questions encouraged   thoughts/feelings acknowledged  Goal: Readiness for Transition of Care  Outcome: Ongoing, Progressing  Intervention: Mutually Develop Transition Plan  Recent Flowsheet Documentation  Taken 2/1/2021 2104 by June Cardona RN  Transportation Anticipated: public transportation  Patient/Family Anticipated Services at Transition: none  Patient/Family Anticipates Transition to: home  Taken 2/1/2021 2103 by June Cardona RN  Equipment Currently Used at Home: none   Goal Outcome Evaluation:   Pt c/o abd pain, PRN meds given. Trops negative X3. VSS on RA, 3L via NC when sleeping. NPO for GI consult today. No further complaints at this time, Will continue to monitor.

## 2021-02-02 NOTE — BRIEF OP NOTE
ESOPHAGOGASTRODUODENOSCOPY  Progress Note    John Chávez  2/2/2021    EGD shows mild gastritis.  Biopsies taken for H. pylori.  Nothing seen to explain patient's symptoms.    Patient feels constipation is significantly contributing to his abdominal discomfort.  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    Mark I. Brunner, MD     Date: 2/2/2021  Time: 13:20 EST

## 2021-02-02 NOTE — PLAN OF CARE
Goal Outcome Evaluation:         Pt. A&Ox4, VSS, steady on his feet. Pt. received EGD today which revealed gastritis, and biopsy sent for ? H. pylori. Pt. still c/o intermittent/sharp epigastric pain 7-8/10. Home dose of Suboxone resumed today. Gave first doses of Relistor and Miralax, education provided.      Problem: Pain Acute  Goal: Optimal Pain Control  Outcome: Ongoing, Progressing  Intervention: Develop Pain Management Plan  Recent Flowsheet Documentation  Taken 2/2/2021 1330 by Ly Carranza, RN  Pain Management Interventions: quiet environment facilitated  Intervention: Prevent or Manage Pain  Recent Flowsheet Documentation  Taken 2/2/2021 1000 by Ly Carranza, RN  Sleep/Rest Enhancement:   awakenings minimized   room darkened  Taken 2/2/2021 0800 by Ly Carranza, RN  Sensory Stimulation Regulation:   care clustered   lighting decreased   television on  Sleep/Rest Enhancement:   awakenings minimized   consistent schedule promoted   room darkened  Intervention: Optimize Psychosocial Wellbeing  Recent Flowsheet Documentation  Taken 2/2/2021 0800 by Ly Carranza, RN  Supportive Measures: active listening utilized  Diversional Activities: television     Problem: Adult Inpatient Plan of Care  Goal: Plan of Care Review  Outcome: Ongoing, Progressing  Goal: Patient-Specific Goal (Individualized)  Outcome: Ongoing, Progressing  Goal: Absence of Hospital-Acquired Illness or Injury  Outcome: Ongoing, Progressing  Intervention: Identify and Manage Fall Risk  Recent Flowsheet Documentation  Taken 2/2/2021 1600 by Ly Carranza, RN  Safety Promotion/Fall Prevention:   activity supervised   assistive device/personal items within reach   clutter free environment maintained   fall prevention program maintained   lighting adjusted   mobility aid in reach   nonskid shoes/slippers when out of bed   room organization consistent   safety round/check completed   toileting scheduled  Taken 2/2/2021 1400 by Tere  Ly INTERIANO RN  Safety Promotion/Fall Prevention:   activity supervised   assistive device/personal items within reach   clutter free environment maintained   fall prevention program maintained   lighting adjusted   nonskid shoes/slippers when out of bed   safety round/check completed   toileting scheduled  Taken 2/2/2021 1330 by Ly Craranza RN  Safety Promotion/Fall Prevention: (Pt. back to room from [facility] s/p EGD.)   activity supervised   assistive device/personal items within reach   clutter free environment maintained   fall prevention program maintained   lighting adjusted   nonskid shoes/slippers when out of bed   safety round/check completed   toileting scheduled   room organization consistent  Taken 2/2/2021 1200 by Ly Carranza RN  Safety Promotion/Fall Prevention: (Pt. transported to [facility] via w/c)   patient off unit   other (see comments)  Taken 2/2/2021 1000 by Ly Carranza RN  Safety Promotion/Fall Prevention:   activity supervised   assistive device/personal items within reach   clutter free environment maintained   fall prevention program maintained   lighting adjusted   nonskid shoes/slippers when out of bed   safety round/check completed  Taken 2/2/2021 0800 by Ly Carranza RN  Safety Promotion/Fall Prevention:   activity supervised   assistive device/personal items within reach   clutter free environment maintained   fall prevention program maintained   lighting adjusted   nonskid shoes/slippers when out of bed   room organization consistent   safety round/check completed  Intervention: Prevent Skin Injury  Recent Flowsheet Documentation  Taken 2/2/2021 1600 by Ly Carranza RN  Body Position:   position changed independently   side-lying, right  Taken 2/2/2021 1400 by Ly Carranza RN  Body Position:   position changed independently   sitting up in bed  Taken 2/2/2021 1330 by Ly Carranza RN  Body Position:   position changed independently   sitting up in bed  Taken 2/2/2021  1000 by Ly Carranza RN  Body Position:   position changed independently   side-lying, right  Taken 2/2/2021 0800 by Ly Carranza RN  Body Position: position changed independently  Intervention: Prevent and Manage VTE (venous thromboembolism) Risk  Recent Flowsheet Documentation  Taken 2/2/2021 0800 by Ly Carranza RN  VTE Prevention/Management:   bilateral   dorsiflexion/plantar flexion performed  Intervention: Prevent Infection  Recent Flowsheet Documentation  Taken 2/2/2021 1600 by Ly Carranza RN  Infection Prevention:   environmental surveillance performed   hand hygiene promoted   personal protective equipment utilized   rest/sleep promoted   single patient room provided   visitors restricted/screened  Taken 2/2/2021 1400 by Ly Carranza RN  Infection Prevention:   environmental surveillance performed   hand hygiene promoted   personal protective equipment utilized   rest/sleep promoted   single patient room provided   visitors restricted/screened  Taken 2/2/2021 1330 by Ly Carranza RN  Infection Prevention:   environmental surveillance performed   hand hygiene promoted   personal protective equipment utilized   rest/sleep promoted   single patient room provided   visitors restricted/screened  Taken 2/2/2021 1000 by Ly Carranza RN  Infection Prevention:   rest/sleep promoted   single patient room provided   visitors restricted/screened   personal protective equipment utilized   hand hygiene promoted   equipment surfaces disinfected  Taken 2/2/2021 0800 by Ly Carranza RN  Infection Prevention:   rest/sleep promoted   single patient room provided   visitors restricted/screened   personal protective equipment utilized   hand hygiene promoted   equipment surfaces disinfected   environmental surveillance performed  Goal: Optimal Comfort and Wellbeing  Outcome: Ongoing, Progressing  Intervention: Provide Person-Centered Care  Recent Flowsheet Documentation  Taken 2/2/2021 0800 by  Ly Carranza, RN  Trust Relationship/Rapport: care explained  Goal: Readiness for Transition of Care  Outcome: Ongoing, Progressing     Problem: Constipation  Goal: Effective Bowel Elimination  Outcome: Ongoing, Progressing

## 2021-02-02 NOTE — H&P
[facility] Medicine Services  Clinical Decision Unit (CDU)  History and Physical    Patient Name: John Chávez  : 1965  MRN: [id number]  Primary Care Physician: Eduardo Jiménez MD  Date of admission: 2021  1:50 PM      Subjective   Subjective     Chief Complaint:  Epigastric abdominal pain    HPI:  John Chávez is a 55 y.o. male with a history of GERD, hepatitis C, hyperlipidemia, hypertension, CAD s/p stents, presents to the ED with complaints of intermittent epigastric pain for the past 2 weeks that worsened over the past 2 days.  Patient also reports constipation for the past 2 weeks and has been taking an over-the-counter laxative daily in order to have a bowel movement.  Last bowel movement was yesterday.  He reports his pain is sharp, and radiates from his epigastric area down his stomach and around to his back.  He reports numbness in his left lower extremity that is chronic and related to his back problems.  He also reports some intermittent shortness of breath, fatigue, lightheadedness, and abdominal distention.  No fever, chills, cough, nausea, vomiting, melena, dysuria, edema, or any other complaints this time.  Last seen by cardiology in 2015 after having his stents placed at [facility].  CT abdomen pelvis shows a 4.8 cm infrarenal abdominal aortic aneurysm, no surrounding periaortic edema.  There is short segment severe atherosclerotic narrowing or occlusion of the left common iliac artery, with reconstitution of the internal and external branches.  Patient reports that he is aware of his AAA, but does not feel it was that large in the past.  He is unsure when his last CT scan was but feels that it was at [facility] in 2015.  Is not currently following with any providers outpatient.  Labs: Troponin x2 is negative, lipase 8  Patient was given aspirin and Dilaudid in the ED, and is being admitted to the hospital medicine service for further evaluation  and management.    Review of Systems   Constitutional: Positive for fatigue. Negative for appetite change, chills, diaphoresis and fever.   HENT: Negative.    Eyes: Negative.    Respiratory: Positive for shortness of breath. Negative for cough and wheezing.    Cardiovascular: Negative.    Gastrointestinal: Positive for abdominal distention, abdominal pain and constipation. Negative for diarrhea, nausea and vomiting.   Endocrine: Negative.    Genitourinary: Negative.    Musculoskeletal: Positive for back pain. Negative for neck pain and neck stiffness.   Skin: Negative.    Allergic/Immunologic: Negative.    Neurological: Negative.    Hematological: Negative.    Psychiatric/Behavioral: Negative.         All other systems reviewed and negative    Personal History     Past Medical History:   Diagnosis Date   • Asthma    • GERD (gastroesophageal reflux disease)    • Hepatitis C    • Hyperlipidemia    • Hypertension    • Myocardial infarction (CMS/HCC)        Past Surgical History:   Procedure Laterality Date   • CARDIAC CATHETERIZATION N/A    • CORONARY ANGIOPLASTY WITH STENT PLACEMENT N/A        Family History: family history includes Aneurysm in his mother; Arthritis in his mother; Heart disease in his father. Otherwise pertinent FHx was reviewed and unremarkable.     Social History:  reports that he has been smoking. He has been smoking about 0.50 packs per day. He has never used smokeless tobacco. He reports that he does not drink alcohol or use drugs.  Social History     Social History Narrative   • Not on file       Medications:  Cholecalciferol, Fluticasone Furoate-Vilanterol, Omeprazole, albuterol sulfate HFA, aspirin, atorvastatin, buprenorphine-naloxone, citalopram, clopidogrel, diclofenac, methocarbamol, and prazosin    Allergies   Allergen Reactions   • Codeine Nausea And Vomiting       Objective   Objective     Vital Signs:   Temp:  [98 °F (36.7 °C)] 98 °F (36.7 °C)  Heart Rate:  [59-70] 65  Resp:  [18]  18  BP: (120-150)/() 133/97    Physical Exam  Constitutional:       Comments: Appears to be in pain   HENT:      Head: Normocephalic.      Nose: Nose normal.   Eyes:      Pupils: Pupils are equal, round, and reactive to light.   Neck:      Musculoskeletal: Normal range of motion.   Cardiovascular:      Rate and Rhythm: Normal rate and regular rhythm.      Heart sounds: Normal heart sounds. No murmur. No friction rub. No gallop.       Comments: Left pedal pulses weak but palpable  Pulmonary:      Effort: Pulmonary effort is normal. No respiratory distress.      Breath sounds: Normal breath sounds. No wheezing, rhonchi or rales.   Chest:      Chest wall: No tenderness.   Abdominal:      General: Bowel sounds are normal. There is distension.      Palpations: Abdomen is soft.      Tenderness: There is abdominal tenderness (epigastric). There is no rebound.   Musculoskeletal: Normal range of motion.         General: No swelling, tenderness or signs of injury.   Skin:     General: Skin is warm and dry.      Coloration: Skin is not pale.      Findings: No erythema or rash.   Neurological:      General: No focal deficit present.      Mental Status: He is alert and oriented to person, place, and time.      Sensory: No sensory deficit.   Psychiatric:         Mood and Affect: Mood normal.         Behavior: Behavior normal.         Thought Content: Thought content normal.         Judgment: Judgment normal.            Results Reviewed:  Troponin x2 is negative, lipase 8   Ct Abdomen Pelvis With Contrast    Result Date: 2/1/2021  4.8 cm infrarenal abdominal aortic aneurysm. In the absence of comparison, evaluation for acute enlargement is limited, however there is no surrounding periaortic edema currently. There is short segment severe atherosclerotic narrowing or occlusion of the left common iliac artery, with reconstitution of the internal and external branches.  No acute findings in the abdomen and pelvis otherwise.    This report was finalized on 2/1/2021 4:13 PM by Eduardo Harris.      Xr Chest 1 View    Result Date: 2/1/2021  Mild chronic changes without evidence of acute disease in the chest.  This report was finalized on 2/1/2021 3:08 PM by Eduardo Harris.      Ct Angiogram Chest    Result Date: 2/1/2021  No pulmonary embolus or other acute infectious/inflammatory process in the chest. Fairly stable background emphysema changes.   This report was finalized on 2/1/2021 3:59 PM by Eduardo Harris.             Assessment/Plan   Assessment / Plan     Active Hospital Problems    Diagnosis POA   • **Epigastric pain [R10.13] Yes   • Acute chest pain [R07.9] Yes   • AAA (abdominal aortic aneurysm) (CMS/HCC) [I71.4] Yes   • Coronary artery disease involving native coronary artery with hx of MI and PCI [I25.10] Yes   • Hyperlipidemia [E78.5] Yes   • Hypertension [I10] Yes   • Tobacco abuse [Z72.0] Yes   • GERD (gastroesophageal reflux disease) [K21.9] Yes       John Chávez is a 55 y.o. male with a history of GERD, hepatitis C, hyperlipidemia, hypertension, CAD s/p stents, presents to the ED with complaints of intermittent epigastric pain for the past 2 weeks that worsened over the past 2 days.  Patient also reports constipation for the past 2 weeks and has been taking an over-the-counter laxative daily in order to have a bowel movement.  Last bowel movement was yesterday.  He reports his pain is sharp, and radiates from his epigastric area down his stomach and around to his back.      Plan:    Epigastric abdominal pain  GERD  --npo after midnight  --protonix IV daily  --consult GI in the am  --GI cocktail x 1 dose now  --aixa  --am labs    Chest pain  HTN  HLD  CAD s/p stents  --was given aspirin in the ED  --troponin x 2 negative  --trend troponin  --continue aspirin and plavix  --EKG in the am    AAA  --CT A/P shows 4.8 cm infrarenal abdominal aortic aneurysm, there is no surrounding periaortic edema currently. There is short  segment severe atherosclerotic narrowing or occlusion of the left common iliac artery, with reconstitution of the internal and external branches.   --follow up with CTS outpt          CODE STATUS:    There are no questions and answers to display.     Admission Status: OBSERVATION status, however if further evaluation or treatment plans warrant, status may change.  Based upon current information, I predict patient's care encounter to be less than or equal to 2 midnights.         Discharge Blueprint (criteria for discharge):   Troponin negative  Pain improved  Cleared for discharge by GI  Tolerating diet

## 2021-02-02 NOTE — CONSULTS
[facility] Gastroenterology Consult    Referring Provider: Judith Matamoros MD    PCP: Eduardo Jiménez MD    Reason for Consultation: Epigastric pain     Chief complaint: Epigastric pain     History of present illness:    John Chávez is a 55 y.o. male who is admitted with a two week history epigastric pain.  The pain is described as intermittent and sharp.  He denies any exacerbating nor relieving factors.  He states he had an EGD and colonoscopy in the remote past but doesn't remember the results.  He has history of chronic hepatitis C and is treatment naive.  He has been taking Meloxicam BID for three months due to lumbar spondylosis and radiculopathy.        He also recently has had difficulty with constipation and hard stools.  He takes OTC laxatives.   He denies any rectal bleeding.     Allergies:  Codeine    Scheduled Meds:  aspirin, 81 mg, Oral, Daily  atorvastatin, 40 mg, Oral, Daily  budesonide-formoterol, 2 puff, Inhalation, BID - RT  [START ON 2/3/2021] buprenorphine-naloxone, 2 tablet, Sublingual, Daily  citalopram, 40 mg, Oral, Daily  clopidogrel, 75 mg, Oral, Daily  enoxaparin, 40 mg, Subcutaneous, Q24H  pantoprazole, 40 mg, Intravenous, Q AM  sodium chloride, 10 mL, Intravenous, Q12H  terazosin, 5 mg, Oral, Nightly         Infusions:       PRN Meds:  methocarbamol  •  sodium chloride  •  sodium chloride    Home Meds:  Medications Prior to Admission   Medication Sig Dispense Refill Last Dose   • Aspirin Low Dose 81 MG EC tablet Take 81 mg by mouth Daily.   2/1/2021 at Unknown time   • atorvastatin (LIPITOR) 40 MG tablet Take 40 mg by mouth every night at bedtime.   1/31/2021 at Unknown time   • clopidogrel (PLAVIX) 75 MG tablet Take 75 mg by mouth Daily.   2/1/2021 at Unknown time   • Fluticasone Furoate-Vilanterol (Breo Ellipta) 100-25 MCG/INH inhaler Inhale 1 puff Daily.   2/1/2021 at Unknown time   • methocarbamol (ROBAXIN) 750 MG tablet Take 1 tablet by mouth 4 (Four) Times a Day As Needed for  Muscle Spasms. 60 tablet 0 2/1/2021 at Unknown time   • prazosin (MINIPRESS) 2 MG capsule Take 4 mg by mouth every night at bedtime.   1/31/2021 at Unknown time   • albuterol sulfate  (90 Base) MCG/ACT inhaler Inhale 2 puffs Every 4 (Four) Hours As Needed for Wheezing. 29 g 11    • citalopram (CeleXA) 40 MG tablet Take 40 mg by mouth Daily.      • D3 Super Strength 50 MCG (2000 UT) capsule Take 2,000 Units by mouth Daily.      • diclofenac (VOLTAREN) 75 MG EC tablet Take 1 tablet by mouth 2 (Two) Times a Day. 60 tablet 5    • Omeprazole 20 MG tablet delayed-release Take 20 mg by mouth Daily. 30 each 2    • Suboxone 8-2 MG film film DISSOLVE 2 FILM(S) SUBLINGUALLY 1 TIME A DAY          ROS: Review of Systems   Constitutional: Positive for fatigue.   HENT: Negative.    Eyes: Negative.    Respiratory: Negative.    Cardiovascular: Negative.    Gastrointestinal: Positive for abdominal pain and constipation. Negative for nausea and vomiting.   Endocrine: Negative.    Genitourinary: Negative.    Musculoskeletal: Negative.    Skin: Negative.    Neurological: Negative.    Hematological: Negative.    Psychiatric/Behavioral: Negative.        PAST MED HX:  Past Medical History:   Diagnosis Date   • Asthma    • GERD (gastroesophageal reflux disease)    • Hepatitis C    • Hyperlipidemia    • Hypertension    • Myocardial infarction (CMS/HCC)        PAST SURG HX:  Past Surgical History:   Procedure Laterality Date   • CARDIAC CATHETERIZATION N/A    • CORONARY ANGIOPLASTY WITH STENT PLACEMENT N/A        FAM HX:  Family History   Problem Relation Age of Onset   • Heart disease Father    • Aneurysm Mother    • Arthritis Mother        SOC HX:  Social History     Socioeconomic History   • Marital status:      Spouse name: Not on file   • Number of children: Not on file   • Years of education: Not on file   • Highest education level: Not on file   Tobacco Use   • Smoking status: Current Every Day Smoker     Packs/day: 0.50  "  • Smokeless tobacco: Never Used   Substance and Sexual Activity   • Alcohol use: No     Comment: Quit in 2007   • Drug use: No     Comment: 2/3/2017   • Sexual activity: Defer       PHYSICAL EXAM  /66 (BP Location: Right arm, Patient Position: Lying)   Pulse 59   Temp 98 °F (36.7 °C) (Oral)   Resp 18   Ht 177.8 cm (70\")   Wt 81.8 kg (180 lb 6.4 oz)   SpO2 92%   BMI 25.88 kg/m²   Wt Readings from Last 3 Encounters:   02/01/21 81.8 kg (180 lb 6.4 oz)   01/28/21 80.7 kg (178 lb)   01/20/21 80.7 kg (178 lb)   ,body mass index is 25.88 kg/m².  Physical Exam  Constitutional:       General: He is not in acute distress.  HENT:      Head: Normocephalic and atraumatic.   Eyes:      General: No scleral icterus.  Cardiovascular:      Rate and Rhythm: Normal rate and regular rhythm.   Pulmonary:      Effort: Pulmonary effort is normal.      Breath sounds: Normal breath sounds.   Abdominal:      General: Bowel sounds are normal.      Palpations: Abdomen is soft.      Tenderness: There is abdominal tenderness in the epigastric area.   Skin:     General: Skin is warm and dry.   Neurological:      General: No focal deficit present.      Mental Status: He is alert and oriented to person, place, and time.   Psychiatric:         Behavior: Behavior normal.         Thought Content: Thought content normal.       Results Review:   I reviewed the patient's new clinical results.    Lab Results   Component Value Date    WBC 6.77 02/02/2021    HGB 11.8 (L) 02/02/2021    HGB 13.2 02/01/2021    HGB 14.8 09/25/2017    HCT 35.0 (L) 02/02/2021    MCV 92.6 02/02/2021     02/02/2021       Lab Results   Component Value Date    INR 0.95 02/01/2021    INR 0.98 07/24/2016    INR 0.96 10/05/2015       Lab Results   Component Value Date    GLUCOSE 92 02/02/2021    BUN 12 02/02/2021    CREATININE 0.79 02/02/2021    EGFRIFNONA 101 02/02/2021    BCR 15.2 02/02/2021     02/02/2021    K 4.4 02/02/2021    CO2 25.0 02/02/2021    " CALCIUM 8.8 02/02/2021    ALBUMIN 4.20 02/01/2021    ALKPHOS 74 02/01/2021    BILITOT 0.3 02/01/2021    ALT 23 02/01/2021    AST 23 02/01/2021     CT Abdomen/Pelvis and Chest: (as interpreted by radiologist)  FINDINGS: The lung bases are grossly clear. Body wall soft tissues are  unremarkable. The liver, spleen, pancreas and bilateral adrenal glands  demonstrate homogeneous attenuation without evidence of suspicious  lesion. A stable left adrenal nodule from 2017 demonstrates  characteristics most compatible with benign adenoma. Small and large  bowel loops are nondilated. There is no suspicious focal bowel wall  thickening. The appendix is normal. Unremarkable gallbladder. No free  fluid or pneumoperitoneum. The kidneys demonstrate symmetric nephrogram  and contrast excretion without evidence of hydronephrosis or contour  deforming mass. The pelvic viscera are unremarkable. There is no  evidence of acute fracture or aggressive osseous lesion. There is  aneurysmal dilatation of the infrarenal abdominal aorta, with  significant circumferential component of intraluminal thrombus and/or  soft plaque, with maximal dimension 4.8 cm. There is short segment  severe atherosclerotic narrowing or occlusion of the left common iliac  artery, with reconstitution of the external and internal branches. The  visceral branches of the abdominal aorta including the celiac, superior  mesenteric and bilateral renal artery origins appear grossly patent.  There is no definite adjacent acute edema or other inflammatory signs of  impending rupture.   IMPRESSION:  4.8 cm infrarenal abdominal aortic aneurysm. In the absence  of comparison, evaluation for acute enlargement is limited, however  there is no surrounding periaortic edema currently. There is short  segment severe atherosclerotic narrowing or occlusion of the left common  iliac artery, with reconstitution of the internal and external branches.   No acute findings in the abdomen and  pelvis otherwise.    ASSESSMENTS/PLANS    1. Epigastric pain  2. Constipation  3. Chronic hepatitis C  4. 4.8 cm abdominal aortic aneurysm, unchanged.    5. CAD, on aspirin and Plavix    Mr. Chávez is a 55 year old male with history of chronic hepatitis C and known 4.8 cm AAA that presents with a two week history of epigastric pain.  He takes Meloxicam BID for lumbar spondylosis with radiculopathy for 3 months.  He denies any melena, nausea nor vomiting.  He does have new constipation as well.      >>> Suspect peptic ulcer disease.  Recommend EGD today.   Patient is notably on Plavix.   >>> BID PPI   >>> Needs outpatient follow up with [facility] for hepatitis C treatment  >>> Needs outpatient screening colonoscopy     >>> Begin daily Miralax     I discussed the patient's findings and my recommendations with patient    DENZEL Villafana  02/02/21  11:17 EST

## 2021-02-02 NOTE — ANESTHESIA PREPROCEDURE EVALUATION
Anesthesia Evaluation     Patient summary reviewed and Nursing notes reviewed                Airway   Mallampati: II  TM distance: >3 FB  Neck ROM: full  No difficulty expected  Dental - normal exam     Pulmonary - normal exam   (+) a smoker Current, COPD, asthma,  Cardiovascular - normal exam    (+) hypertension, CAD, cardiac stents (asa/plavix yesterday) hyperlipidemia,     ROS comment:   Stress/echo 7/16:  wnl with no significant echocardiographic evidence for myocardial ischemia.    Neuro/Psych- negative ROS  GI/Hepatic/Renal/Endo    (+)  GERD,  hepatitis C, liver disease,     ROS Comment: Epigastric pain    Musculoskeletal (-) negative ROS    Abdominal  - normal exam    Bowel sounds: normal.   Substance History - negative use     OB/GYN negative ob/gyn ROS         Other                        Anesthesia Plan    ASA 3     general   (Propofol)  intravenous induction     Anesthetic plan, all risks, benefits, and alternatives have been provided, discussed and informed consent has been obtained with: patient.    Plan discussed with CRNA.

## 2021-02-02 NOTE — PROGRESS NOTES
Continued Stay Note  [facility]     Patient Name: John Chávez  MRN: [id number]  Today's Date: 2/2/2021    Admit Date: 2/1/2021    Discharge Plan     Row Name 02/02/21 1104       Plan    Plan  Home at DC    Patient/Family in Agreement with Plan  other (see comments)    Plan Comments  No DC needs identified at this time.    Final Discharge Disposition Code  01 - home or self-care    Row Name 02/02/21 0849       Plan    Final Discharge Disposition Code  01 - home or self-care        Discharge Codes    No documentation.       Expected Discharge Date and Time     Expected Discharge Date Expected Discharge Time    Feb 3, 2021             Maki Mott RN

## 2021-02-02 NOTE — ANESTHESIA POSTPROCEDURE EVALUATION
Patient: John Chávez    Procedure Summary     Date: 02/02/21 Room / Location:  [facility]    Anesthesia Start: 1237 Anesthesia Stop:     Procedure: ESOPHAGOGASTRODUODENOSCOPY (N/A ) Diagnosis:       Epigastric pain      (Epigastric pain [R10.13])    Surgeon: Brunner, Mark I, MD Provider: Dave Weeks MD    Anesthesia Type: general ASA Status: 3          Anesthesia Type: general    Vitals  No vitals data found for the desired time range.          Post Anesthesia Care and Evaluation    Patient location during evaluation: PACU  Patient participation: complete - patient participated  Level of consciousness: awake  Pain score: 0  Pain management: adequate  Airway patency: patent  Anesthetic complications: No anesthetic complications  PONV Status: none  Cardiovascular status: acceptable and stable  Respiratory status: nasal cannula, unassisted, acceptable and spontaneous ventilation  Hydration status: acceptable

## 2021-02-03 ENCOUNTER — APPOINTMENT (OUTPATIENT)
Dept: ULTRASOUND IMAGING | Facility: HOSPITAL | Age: 56
End: 2021-02-03

## 2021-02-03 LAB
ALBUMIN SERPL-MCNC: 3.7 G/DL (ref 3.5–5.2)
ALBUMIN/GLOB SERPL: 1.4 G/DL
ALP SERPL-CCNC: 66 U/L (ref 39–117)
ALT SERPL W P-5'-P-CCNC: 21 U/L (ref 1–41)
ANION GAP SERPL CALCULATED.3IONS-SCNC: 9 MMOL/L (ref 5–15)
AST SERPL-CCNC: 17 U/L (ref 1–40)
BILIRUB SERPL-MCNC: 0.3 MG/DL (ref 0–1.2)
BUN SERPL-MCNC: 14 MG/DL (ref 6–20)
BUN/CREAT SERPL: 17.3 (ref 7–25)
CALCIUM SPEC-SCNC: 9.5 MG/DL (ref 8.6–10.5)
CHLORIDE SERPL-SCNC: 107 MMOL/L (ref 98–107)
CO2 SERPL-SCNC: 28 MMOL/L (ref 22–29)
CREAT SERPL-MCNC: 0.81 MG/DL (ref 0.76–1.27)
CYTO UR: NORMAL
DEPRECATED RDW RBC AUTO: 47.8 FL (ref 37–54)
ERYTHROCYTE [DISTWIDTH] IN BLOOD BY AUTOMATED COUNT: 13.9 % (ref 12.3–15.4)
GFR SERPL CREATININE-BSD FRML MDRD: 98 ML/MIN/1.73
GLOBULIN UR ELPH-MCNC: 2.7 GM/DL
GLUCOSE SERPL-MCNC: 95 MG/DL (ref 65–99)
HCT VFR BLD AUTO: 36 % (ref 37.5–51)
HGB BLD-MCNC: 11.9 G/DL (ref 13–17.7)
LAB AP CASE REPORT: NORMAL
LAB AP CLINICAL INFORMATION: NORMAL
MCH RBC QN AUTO: 31.2 PG (ref 26.6–33)
MCHC RBC AUTO-ENTMCNC: 33.1 G/DL (ref 31.5–35.7)
MCV RBC AUTO: 94.2 FL (ref 79–97)
PATH REPORT.FINAL DX SPEC: NORMAL
PATH REPORT.GROSS SPEC: NORMAL
PLATELET # BLD AUTO: 187 10*3/MM3 (ref 140–450)
PMV BLD AUTO: 10.3 FL (ref 6–12)
POTASSIUM SERPL-SCNC: 4.3 MMOL/L (ref 3.5–5.2)
PROT SERPL-MCNC: 6.4 G/DL (ref 6–8.5)
RBC # BLD AUTO: 3.82 10*6/MM3 (ref 4.14–5.8)
SODIUM SERPL-SCNC: 144 MMOL/L (ref 136–145)
WBC # BLD AUTO: 8.18 10*3/MM3 (ref 3.4–10.8)

## 2021-02-03 PROCEDURE — 80053 COMPREHEN METABOLIC PANEL: CPT | Performed by: INTERNAL MEDICINE

## 2021-02-03 PROCEDURE — 25010000002 ENOXAPARIN PER 10 MG: Performed by: INTERNAL MEDICINE

## 2021-02-03 PROCEDURE — 99225 PR SBSQ OBSERVATION CARE/DAY 25 MINUTES: CPT | Performed by: INTERNAL MEDICINE

## 2021-02-03 PROCEDURE — 76705 ECHO EXAM OF ABDOMEN: CPT

## 2021-02-03 PROCEDURE — G0378 HOSPITAL OBSERVATION PER HR: HCPCS

## 2021-02-03 PROCEDURE — 85027 COMPLETE CBC AUTOMATED: CPT | Performed by: INTERNAL MEDICINE

## 2021-02-03 PROCEDURE — 99212 OFFICE O/P EST SF 10 MIN: CPT | Performed by: PHYSICIAN ASSISTANT

## 2021-02-03 PROCEDURE — 94799 UNLISTED PULMONARY SVC/PX: CPT

## 2021-02-03 RX ORDER — BISACODYL 10 MG
10 SUPPOSITORY, RECTAL RECTAL DAILY
Status: DISCONTINUED | OUTPATIENT
Start: 2021-02-03 | End: 2021-02-05 | Stop reason: HOSPADM

## 2021-02-03 RX ORDER — MAGNESIUM CARB/ALUMINUM HYDROX 105-160MG
296 TABLET,CHEWABLE ORAL ONCE
Status: COMPLETED | OUTPATIENT
Start: 2021-02-03 | End: 2021-02-03

## 2021-02-03 RX ORDER — PANTOPRAZOLE SODIUM 40 MG/1
40 TABLET, DELAYED RELEASE ORAL
Status: DISCONTINUED | OUTPATIENT
Start: 2021-02-04 | End: 2021-02-05 | Stop reason: HOSPADM

## 2021-02-03 RX ORDER — ALBUTEROL SULFATE 90 UG/1
2 AEROSOL, METERED RESPIRATORY (INHALATION) EVERY 4 HOURS PRN
Status: DISCONTINUED | OUTPATIENT
Start: 2021-02-03 | End: 2021-02-05 | Stop reason: HOSPADM

## 2021-02-03 RX ADMIN — SODIUM CHLORIDE, PRESERVATIVE FREE 10 ML: 5 INJECTION INTRAVENOUS at 08:14

## 2021-02-03 RX ADMIN — BUPRENORPHINE AND NALOXONE 2 TABLET: 8; 2 TABLET SUBLINGUAL at 08:14

## 2021-02-03 RX ADMIN — ASPIRIN 81 MG: 81 TABLET, COATED ORAL at 08:14

## 2021-02-03 RX ADMIN — CLOPIDOGREL BISULFATE 75 MG: 75 TABLET ORAL at 08:14

## 2021-02-03 RX ADMIN — ATORVASTATIN CALCIUM 40 MG: 40 TABLET, FILM COATED ORAL at 08:14

## 2021-02-03 RX ADMIN — BUDESONIDE AND FORMOTEROL FUMARATE DIHYDRATE 2 PUFF: 80; 4.5 AEROSOL RESPIRATORY (INHALATION) at 09:42

## 2021-02-03 RX ADMIN — PANTOPRAZOLE SODIUM 40 MG: 40 INJECTION, POWDER, FOR SOLUTION INTRAVENOUS at 05:46

## 2021-02-03 RX ADMIN — POLYETHYLENE GLYCOL 3350 17 G: 17 POWDER, FOR SOLUTION ORAL at 08:14

## 2021-02-03 RX ADMIN — TERAZOSIN HYDROCHLORIDE 5 MG: 5 CAPSULE ORAL at 22:27

## 2021-02-03 RX ADMIN — SODIUM CHLORIDE, PRESERVATIVE FREE 10 ML: 5 INJECTION INTRAVENOUS at 22:32

## 2021-02-03 RX ADMIN — CITALOPRAM HYDROBROMIDE 40 MG: 40 TABLET ORAL at 08:14

## 2021-02-03 RX ADMIN — ENOXAPARIN SODIUM 40 MG: 40 INJECTION SUBCUTANEOUS at 22:26

## 2021-02-03 RX ADMIN — Medication 296 ML: at 17:58

## 2021-02-03 NOTE — PLAN OF CARE
Goal Outcome Evaluation:         Patient is progressing in the plan of care. Patient is updated on the plan of care.

## 2021-02-03 NOTE — PROGRESS NOTES
Discharge Planning Assessment  [facility]     Patient Name: John Chávez  MRN: [id number]  Today's Date: 2/3/2021    Admit Date: 2/1/2021    Discharge Needs Assessment     Row Name 02/03/21 1330       Living Environment    Lives With  alone    Current Living Arrangements  home/apartment/condo       Discharge Needs Assessment    Equipment Currently Used at Home  none    Equipment Needed After Discharge  none        Discharge Plan     Row Name 02/03/21 9951       Plan    Plan  Home at DC    Patient/Family in Agreement with Plan  yes    Plan Comments  I spoke with the pt. He denies DC needs at this time.    Final Discharge Disposition Code  01 - home or self-care        Continued Care and Services - Admitted Since 2/1/2021    Coordination has not been started for this encounter.       Expected Discharge Date and Time     Expected Discharge Date Expected Discharge Time    Feb 3, 2021         Demographic Summary    No documentation.       Functional Status     Row Name 02/03/21 1330       Functional Status    Usual Activity Tolerance  good       Functional Status, IADL    Medications  independent    Meal Preparation  independent    Housekeeping  independent    Laundry  independent    Shopping  independent        Psychosocial    No documentation.       Abuse/Neglect    No documentation.       Legal    No documentation.       Substance Abuse    No documentation.       Patient Forms    No documentation.           Maki Mott, RN

## 2021-02-03 NOTE — PROGRESS NOTES
"GI Daily Progress Note  Subjective:    Chief Complaint:  Follow up abdominal pain     Mr. Rodriguez abdominal pain persists.  Today, he describes sharp diffuse abdominal pain that is persistent.  No relieving nor exacerbating factors.  He has not had a bowel movement.      Objective:    /63 (BP Location: Right arm, Patient Position: Lying)   Pulse 65   Temp 98.6 °F (37 °C) (Oral)   Resp 18   Ht 177.8 cm (70\")   Wt 81.8 kg (180 lb 6.4 oz)   SpO2 92%   BMI 25.88 kg/m²     Physical Exam  Constitutional:       General: He is not in acute distress.     Appearance: He is not ill-appearing.   HENT:      Head: Normocephalic and atraumatic.   Cardiovascular:      Rate and Rhythm: Normal rate and regular rhythm.   Pulmonary:      Effort: Pulmonary effort is normal.      Breath sounds: Normal breath sounds.   Abdominal:      General: Bowel sounds are normal.      Palpations: Abdomen is soft.      Comments: Mild diffuse tenderness to palpation  No guarding nor rebound tenderness  Bowel sounds are normal    Neurological:      General: No focal deficit present.      Mental Status: He is alert and oriented to person, place, and time.   Psychiatric:         Thought Content: Thought content normal.         Lab  Lab Results   Component Value Date    WBC 8.18 02/03/2021    HGB 11.9 (L) 02/03/2021    HGB 11.8 (L) 02/02/2021    HGB 13.2 02/01/2021    MCV 94.2 02/03/2021     02/03/2021    INR 0.95 02/01/2021    INR 0.98 07/24/2016    INR 0.96 10/05/2015       Lab Results   Component Value Date    GLUCOSE 95 02/03/2021    BUN 14 02/03/2021    CREATININE 0.81 02/03/2021    EGFRIFNONA 98 02/03/2021    BCR 17.3 02/03/2021     02/03/2021    K 4.3 02/03/2021    CO2 28.0 02/03/2021    CALCIUM 9.5 02/03/2021    ALBUMIN 3.70 02/03/2021    ALKPHOS 66 02/03/2021    BILITOT 0.3 02/03/2021    ALT 21 02/03/2021    AST 17 02/03/2021       Assessment:    Abdominal pain  Constipation, suspect opiate induced.    Mild gastritis "   Chronic hepatitis C    Plan:    No response to Relistor overnight.       >>> Will obtain a gallbladder ultrasound  >>> Trial of magnesium citrate x 1   >>> Will follow up on antrum biopsies    DENZEL Villafana  02/03/21  09:25 EST

## 2021-02-03 NOTE — PROGRESS NOTES
[facility] Medicine Services  PROGRESS NOTE    Patient Name: John Chávez  : 1965  MRN: [id number]    Date of Admission: 2021  Primary Care Physician: Eduardo Jiménez MD    Subjective   Subjective     CC:  abd pain    HPI:  Still no BM, still hurting per staff (though per RT, he was laughing and seemed less uncomfortable than previous.). Currently asleep and I did not wake him.  GB US still pending     ROS:  mild cough, was asking for his albuterol (takes at home)    Objective   Objective     Vital Signs:   Temp:  [98 °F (36.7 °C)-98.7 °F (37.1 °C)] 98 °F (36.7 °C)  Heart Rate:  [61-73] 65  Resp:  [18-20] 18  BP: (110-118)/(63-81) 110/70        Physical Exam:  Gen:  WD/WN man asleep, NAD witwh reg resps  Neuro: asleep  hent - NC/AT   Heart RRR no murmur, rub, or gallop  Lungs CTA nonlabored on RA   Abd:  Not distended  Extrem:  No c/c/e      Results Reviewed:  Results from last 7 days   Lab Units 21  04321  0521  1403   WBC 10*3/mm3 8.18 6.77 7.95   HEMOGLOBIN g/dL 11.9* 11.8* 13.2   HEMATOCRIT % 36.0* 35.0* 39.6   PLATELETS 10*3/mm3 187 180 210   INR   --   --  0.95     Results from last 7 days   Lab Units 21  0430 21  0521  2108 21  1552 21  1403   SODIUM mmol/L 144 139  --   --  143   POTASSIUM mmol/L 4.3 4.4  --   --  4.0   CHLORIDE mmol/L 107 104  --   --  105   CO2 mmol/L 28.0 25.0  --   --  29.0   BUN mg/dL 14 12  --   --  13   CREATININE mg/dL 0.81 0.79  --   --  0.81   GLUCOSE mg/dL 95 92  --   --  119*   CALCIUM mg/dL 9.5 8.8  --   --  9.3   ALT (SGPT) U/L 21  --   --   --  23   AST (SGOT) U/L 17  --   --   --  23   TROPONIN T ng/mL  --  <0.010 <0.010 <0.010 <0.010   PROBNP pg/mL  --   --   --   --  23.4     Estimated Creatinine Clearance: 116.4 mL/min (by C-G formula based on SCr of 0.81 mg/dL).    Microbiology Results Abnormal     Procedure Component Value - Date/Time    COVID PRE-OP / PRE-PROCEDURE  SCREENING ORDER (NO ISOLATION) - Swab, Nasopharynx [[id number]]  (Normal) Collected: 02/01/21 1809    Lab Status: Final result Specimen: Swab from Nasopharynx Updated: 02/01/21 1909    Narrative:      The following orders were created for panel order COVID PRE-OP / PRE-PROCEDURE SCREENING ORDER (NO ISOLATION) - Swab, Nasopharynx.  Procedure                               Abnormality         Status                     ---------                               -----------         ------                     COVID-19 and FLU A/B PCR...[[id number]]  Normal              Final result                 Please view results for these tests on the individual orders.    COVID-19 and FLU A/B PCR - Swab, Nasopharynx [[id number]]  (Normal) Collected: 02/01/21 1809    Lab Status: Final result Specimen: Swab from Nasopharynx Updated: 02/01/21 1909     COVID19 Not Detected     Influenza A PCR Not Detected     Influenza B PCR Not Detected    Narrative:      Fact sheet for providers: https://www.fda.gov/media/433026/download    Fact sheet for patients: https://www.fda.gov/media/920489/download    Test performed by PCR.          Imaging Results (Last 24 Hours)     ** No results found for the last 24 hours. **          Results for orders placed during the hospital encounter of 07/24/16   Adult stress echo only    Narrative · Patient C/O chest pain 7/10 at baseline; this increased to a 10/10 with   exercise; patient given one sublingual spray of nitroglycerin; 4 minutes   into recovery his CP was back to baseline.  · BMi = 25.1; ЕКАТЕРИНА = (+18); expected exercise endurance = 10:10; actual   =8:22.  · No significant ST or T wave changes with exercise.  · Abnormal acceptable nondiagnostic echocardiographic GXT with exersise to   only 57% PMHR and 82% predicted exercise capacity.          I have reviewed the medications:  Scheduled Meds:aspirin, 81 mg, Oral, Daily  atorvastatin, 40 mg, Oral, Daily  bisacodyl, 10 mg, Rectal, Daily  budesonide-formoterol,  2 puff, Inhalation, BID - RT  buprenorphine-naloxone, 2 tablet, Sublingual, Daily  citalopram, 40 mg, Oral, Daily  clopidogrel, 75 mg, Oral, Daily  enoxaparin, 40 mg, Subcutaneous, Q24H  LORazepam, 1 mg, Oral, Once  magnesium citrate, 296 mL, Oral, Once  methylnaltrexone, 12 mg, Subcutaneous, Every Other Day  [START ON 2/4/2021] pantoprazole, 40 mg, Oral, Q AM  polyethylene glycol, 17 g, Oral, Daily  sodium chloride, 10 mL, Intravenous, Q12H  terazosin, 5 mg, Oral, Nightly      Continuous Infusions:   PRN Meds:.methocarbamol  •  sodium chloride  •  sodium chloride    Assessment/Plan   Assessment & Plan     Active Hospital Problems    Diagnosis  POA   • **Epigastric pain [R10.13]  Yes   • Acute chest pain [R07.9]  Yes   • AAA (abdominal aortic aneurysm) (CMS/HCC) [I71.4]  Yes   • Coronary artery disease involving native coronary artery with hx of MI and PCI [I25.10]  Yes   • Hyperlipidemia [E78.5]  Yes   • Hypertension [I10]  Yes   • Tobacco abuse [Z72.0]  Yes   • GERD (gastroesophageal reflux disease) [K21.9]  Yes      Resolved Hospital Problems   No resolved problems to display.        Brief Hospital Course to date:  John Chávez is a 55 y.o. male  with a history of GERD, hepatitis C, hyperlipidemia, hypertension, CAD s/p stents, presents to the ED with complaints of intermittent epigastric pain for the past 2 weeks that worsened over the past 2 days.  Patient also reports constipation for the past 2 weeks and has been taking an over-the-counter laxative daily in order to have a bowel movement.  Last bowel movement was yesterday.  He reports his pain is sharp, and radiates from his epigastric area down his stomach and around to his back.       All problems are new to me.  Chart reviewed and updated.    Plan:     Epigastric abdominal pain  GERD  -- EGD w mild gastritis only  - treating for constip - mag citrate today  - GbUS pending     Chest pain  HTN  HLD  CAD s/p stents  --troponins neg   - Ekg nl        AAA  --CT A/P shows 4.8 cm infrarenal abdominal aortic aneurysm, there is no surrounding periaortic edema currently. There is short segment severe atherosclerotic narrowing or occlusion of the left common iliac artery, with reconstitution of the internal and external branches.   --follow up with CTS outpt  - if pain continues post BM, consider leak/small dissection - watch carefully        DVT Prophylaxis:  [facility]       Disposition: I expect the patient to be discharged tbd    CODE STATUS:   Code Status and Medical Interventions:   Ordered at: 02/01/21 2111     Level Of Support Discussed With:    Patient     Code Status:    CPR     Medical Interventions (Level of Support Prior to Arrest):    Full       Domi Marina MD  02/03/21

## 2021-02-03 NOTE — PLAN OF CARE
Problem: Pain Acute  Goal: Optimal Pain Control  Outcome: Ongoing, Progressing  Intervention: Prevent or Manage Pain  Recent Flowsheet Documentation  Taken 2/2/2021 2055 by June Cardona RN  Sensory Stimulation Regulation:   care clustered   lighting decreased  Sleep/Rest Enhancement:   awakenings minimized   consistent schedule promoted   noise level reduced   regular sleep/rest pattern promoted  Intervention: Optimize Psychosocial Wellbeing  Recent Flowsheet Documentation  Taken 2/2/2021 2055 by June Cardona RN  Supportive Measures: active listening utilized  Diversional Activities: television     Problem: Adult Inpatient Plan of Care  Goal: Plan of Care Review  Outcome: Ongoing, Progressing  Goal: Patient-Specific Goal (Individualized)  Outcome: Ongoing, Progressing  Goal: Absence of Hospital-Acquired Illness or Injury  Outcome: Ongoing, Progressing  Intervention: Identify and Manage Fall Risk  Recent Flowsheet Documentation  Taken 2/3/2021 0600 by June Cardona RN  Safety Promotion/Fall Prevention:   activity supervised   assistive device/personal items within reach   clutter free environment maintained   fall prevention program maintained   lighting adjusted   nonskid shoes/slippers when out of bed   room organization consistent   safety round/check completed  Taken 2/3/2021 0400 by June Cardona RN  Safety Promotion/Fall Prevention:   activity supervised   assistive device/personal items within reach   clutter free environment maintained   fall prevention program maintained   lighting adjusted   nonskid shoes/slippers when out of bed   safety round/check completed   room organization consistent  Taken 2/3/2021 0200 by June Cardona RN  Safety Promotion/Fall Prevention:   activity supervised   assistive device/personal items within reach   clutter free environment maintained   fall prevention program maintained   mobility aid in reach   nonskid shoes/slippers when out of bed   room organization  consistent   safety round/check completed  Taken 2/3/2021 0000 by June Cardona RN  Safety Promotion/Fall Prevention:   activity supervised   assistive device/personal items within reach   clutter free environment maintained   fall prevention program maintained   lighting adjusted   nonskid shoes/slippers when out of bed   room organization consistent   safety round/check completed  Taken 2/2/2021 2200 by June Cardona RN  Safety Promotion/Fall Prevention:   activity supervised   assistive device/personal items within reach   clutter free environment maintained   fall prevention program maintained   lighting adjusted   nonskid shoes/slippers when out of bed   room organization consistent   safety round/check completed  Taken 2/2/2021 2055 by June Cardona RN  Safety Promotion/Fall Prevention:   activity supervised   assistive device/personal items within reach   clutter free environment maintained   fall prevention program maintained   lighting adjusted   nonskid shoes/slippers when out of bed   room organization consistent   safety round/check completed  Intervention: Prevent Skin Injury  Recent Flowsheet Documentation  Taken 2/3/2021 0600 by June Cardona RN  Body Position: position changed independently  Taken 2/3/2021 0400 by June Cardona RN  Body Position: position changed independently  Taken 2/3/2021 0200 by June Cardona RN  Body Position: position changed independently  Taken 2/3/2021 0000 by June Cardona RN  Body Position: position changed independently  Taken 2/2/2021 2200 by June Cardona RN  Body Position: position changed independently  Taken 2/2/2021 2055 by June Cardona RN  Body Position: position changed independently  Intervention: Prevent and Manage VTE (venous thromboembolism) Risk  Recent Flowsheet Documentation  Taken 2/2/2021 2055 by June Cardona RN  VTE Prevention/Management: (Lovenox)   bilateral   dorsiflexion/plantar flexion performed  Intervention: Prevent  Infection  Recent Flowsheet Documentation  Taken 2/3/2021 0600 by June Cardona RN  Infection Prevention:   rest/sleep promoted   single patient room provided  Taken 2/3/2021 0400 by June Cardona RN  Infection Prevention:   rest/sleep promoted   single patient room provided  Taken 2/3/2021 0200 by June Cardona RN  Infection Prevention:   rest/sleep promoted   single patient room provided  Taken 2/3/2021 0000 by June Cardona RN  Infection Prevention:   rest/sleep promoted   single patient room provided  Taken 2/2/2021 2200 by June Cardona RN  Infection Prevention:   rest/sleep promoted   single patient room provided  Taken 2/2/2021 2055 by June Cardona RN  Infection Prevention:   rest/sleep promoted   single patient room provided  Goal: Optimal Comfort and Wellbeing  Outcome: Ongoing, Progressing  Intervention: Provide Person-Centered Care  Recent Flowsheet Documentation  Taken 2/2/2021 2055 by June Cardona RN  Trust Relationship/Rapport:   care explained   choices provided   emotional support provided   empathic listening provided   questions answered   questions encouraged   thoughts/feelings acknowledged  Goal: Readiness for Transition of Care  Outcome: Ongoing, Progressing     Problem: Constipation  Goal: Effective Bowel Elimination  Outcome: Ongoing, Progressing   Goal Outcome Evaluation:   pt has had no complaints through the night. VSS on RA. F/u with GI when D/c'd. AMY STRINGER per monitor. Will continue to monitor.

## 2021-02-04 ENCOUNTER — APPOINTMENT (OUTPATIENT)
Dept: GENERAL RADIOLOGY | Facility: HOSPITAL | Age: 56
End: 2021-02-04

## 2021-02-04 PROCEDURE — 99212 OFFICE O/P EST SF 10 MIN: CPT | Performed by: INTERNAL MEDICINE

## 2021-02-04 PROCEDURE — 74018 RADEX ABDOMEN 1 VIEW: CPT

## 2021-02-04 PROCEDURE — 94799 UNLISTED PULMONARY SVC/PX: CPT

## 2021-02-04 PROCEDURE — 25010000002 METHYLNALTREXONE 12 MG/0.6ML SOLUTION: Performed by: INTERNAL MEDICINE

## 2021-02-04 PROCEDURE — G0378 HOSPITAL OBSERVATION PER HR: HCPCS

## 2021-02-04 PROCEDURE — 25010000002 ENOXAPARIN PER 10 MG: Performed by: INTERNAL MEDICINE

## 2021-02-04 PROCEDURE — 99226 PR SBSQ OBSERVATION CARE/DAY 35 MINUTES: CPT | Performed by: INTERNAL MEDICINE

## 2021-02-04 RX ORDER — BUPRENORPHINE HYDROCHLORIDE AND NALOXONE HYDROCHLORIDE DIHYDRATE 8; 2 MG/1; MG/1
2 TABLET SUBLINGUAL DAILY
Status: DISCONTINUED | OUTPATIENT
Start: 2021-02-05 | End: 2021-02-05 | Stop reason: HOSPADM

## 2021-02-04 RX ADMIN — CLOPIDOGREL BISULFATE 75 MG: 75 TABLET ORAL at 09:02

## 2021-02-04 RX ADMIN — BUPRENORPHINE AND NALOXONE 2 TABLET: 8; 2 TABLET SUBLINGUAL at 09:02

## 2021-02-04 RX ADMIN — BUDESONIDE AND FORMOTEROL FUMARATE DIHYDRATE 2 PUFF: 80; 4.5 AEROSOL RESPIRATORY (INHALATION) at 20:02

## 2021-02-04 RX ADMIN — ASPIRIN 81 MG: 81 TABLET, COATED ORAL at 09:02

## 2021-02-04 RX ADMIN — PANTOPRAZOLE SODIUM 40 MG: 40 TABLET, DELAYED RELEASE ORAL at 05:42

## 2021-02-04 RX ADMIN — BUDESONIDE AND FORMOTEROL FUMARATE DIHYDRATE 2 PUFF: 80; 4.5 AEROSOL RESPIRATORY (INHALATION) at 08:55

## 2021-02-04 RX ADMIN — ENOXAPARIN SODIUM 40 MG: 40 INJECTION SUBCUTANEOUS at 22:00

## 2021-02-04 RX ADMIN — CITALOPRAM HYDROBROMIDE 40 MG: 40 TABLET ORAL at 09:02

## 2021-02-04 RX ADMIN — ATORVASTATIN CALCIUM 40 MG: 40 TABLET, FILM COATED ORAL at 09:02

## 2021-02-04 RX ADMIN — METHYLNALTREXONE BROMIDE 12 MG: 12 INJECTION, SOLUTION SUBCUTANEOUS at 09:00

## 2021-02-04 RX ADMIN — SODIUM CHLORIDE, PRESERVATIVE FREE 10 ML: 5 INJECTION INTRAVENOUS at 09:02

## 2021-02-04 RX ADMIN — TERAZOSIN HYDROCHLORIDE 5 MG: 5 CAPSULE ORAL at 20:37

## 2021-02-04 RX ADMIN — SODIUM CHLORIDE, PRESERVATIVE FREE 10 ML: 5 INJECTION INTRAVENOUS at 20:44

## 2021-02-04 NOTE — PROGRESS NOTES
[facility] Medicine Services  PROGRESS NOTE    Patient Name: John Chávez  : 1965  MRN: [id number]    Date of Admission: 2021  Primary Care Physician: Eduardo Jiménez MD    Subjective   Subjective     CC:  abd pain    HPI:  Had a liquid BM this morning, then refused Miralax.  Abd pain still a 7.  Stabbing, comes and goes, radiates to back, is epigastric.      ROS:  No fevers.   States he has never had constipation before, despite suboxone   GI cocktail in ED did not help him.     Objective   Objective     Vital Signs:   Temp:  [98.1 °F (36.7 °C)-98.7 °F (37.1 °C)] 98.7 °F (37.1 °C)  Heart Rate:  [53-68] 63  Resp:  [12-18] 16  BP: ()/(60-76) 122/68        Physical Exam:  Gen:  WD/WN man in bed, pleasant, mild distress  Neuro: alert, oriented,  clear speech   hent - NC/AT   Neck supple  Heart RRR no murmur, rub, or gallop  Lungs CTA nonlabored on RA   Abd:  Mildly distended, mod tender epigastrium. Positive BS    Extrem:  No c/c/e      Results Reviewed:  Results from last 7 days   Lab Units 21  04321  0521  1403   WBC 10*3/mm3 8.18 6.77 7.95   HEMOGLOBIN g/dL 11.9* 11.8* 13.2   HEMATOCRIT % 36.0* 35.0* 39.6   PLATELETS 10*3/mm3 187 180 210   INR   --   --  0.95     Results from last 7 days   Lab Units 21  0430 21  0522 21  2108 21  1552 21  1403   SODIUM mmol/L 144 139  --   --  143   POTASSIUM mmol/L 4.3 4.4  --   --  4.0   CHLORIDE mmol/L 107 104  --   --  105   CO2 mmol/L 28.0 25.0  --   --  29.0   BUN mg/dL 14 12  --   --  13   CREATININE mg/dL 0.81 0.79  --   --  0.81   GLUCOSE mg/dL 95 92  --   --  119*   CALCIUM mg/dL 9.5 8.8  --   --  9.3   ALT (SGPT) U/L 21  --   --   --  23   AST (SGOT) U/L 17  --   --   --  23   TROPONIN T ng/mL  --  <0.010 <0.010 <0.010 <0.010   PROBNP pg/mL  --   --   --   --  23.4     Estimated Creatinine Clearance: 116.4 mL/min (by C-G formula based on SCr of 0.81  mg/dL).    Microbiology Results Abnormal     Procedure Component Value - Date/Time    COVID PRE-OP / PRE-PROCEDURE SCREENING ORDER (NO ISOLATION) - Swab, Nasopharynx [[id number]]  (Normal) Collected: 02/01/21 1809    Lab Status: Final result Specimen: Swab from Nasopharynx Updated: 02/01/21 1909    Narrative:      The following orders were created for panel order COVID PRE-OP / PRE-PROCEDURE SCREENING ORDER (NO ISOLATION) - Swab, Nasopharynx.  Procedure                               Abnormality         Status                     ---------                               -----------         ------                     COVID-19 and FLU A/B PCR...[[id number]]  Normal              Final result                 Please view results for these tests on the individual orders.    COVID-19 and FLU A/B PCR - Swab, Nasopharynx [[id number]]  (Normal) Collected: 02/01/21 1809    Lab Status: Final result Specimen: Swab from Nasopharynx Updated: 02/01/21 1909     COVID19 Not Detected     Influenza A PCR Not Detected     Influenza B PCR Not Detected    Narrative:      Fact sheet for providers: https://www.fda.gov/media/199341/download    Fact sheet for patients: https://www.fda.gov/media/296208/download    Test performed by PCR.          Imaging Results (Last 24 Hours)     Procedure Component Value Units Date/Time    US Gallbladder [[id number]] Collected: 02/03/21 1831     Updated: 02/03/21 1835    Narrative:      EXAMINATION: US GALLBLADDER-      INDICATION: Abdominal pain; R07.9-Chest pain, unspecified;  R10.9-Unspecified abdominal pain; I71.4-Abdominal aortic aneurysm,  without rupture; R10.13-Epigastric pain      COMPARISON: NONE     FINDINGS: Sonographic grayscale and color Doppler evaluation of the  right upper quadrant demonstrates     Limited visualization of the pancreas is unremarkable.     Homogeneous liver without evidence of focal lesion or biliary ductal  dilatation.     Unremarkable gallbladder without evidence of stones or  wall thickening.  Normal 4 mm common bile duct.     Right kidney measures 9.5 cm in length without apparent mass or  hydronephrosis. Normal color Doppler flow.       Impression:      Normal right upper quadrant ultrasound.     This report was finalized on 2/3/2021 6:32 PM by Eduardo Harris.             Results for orders placed during the hospital encounter of 07/24/16   Adult stress echo only    Narrative · Patient C/O chest pain 7/10 at baseline; this increased to a 10/10 with   exercise; patient given one sublingual spray of nitroglycerin; 4 minutes   into recovery his CP was back to baseline.  · BMi = 25.1; ЕКАТЕРИНА = (+18); expected exercise endurance = 10:10; actual   =8:22.  · No significant ST or T wave changes with exercise.  · Abnormal acceptable nondiagnostic echocardiographic GXT with exersise to   only 57% PMHR and 82% predicted exercise capacity.          I have reviewed the medications:  Scheduled Meds:aspirin, 81 mg, Oral, Daily  atorvastatin, 40 mg, Oral, Daily  bisacodyl, 10 mg, Rectal, Daily  budesonide-formoterol, 2 puff, Inhalation, BID - RT  buprenorphine-naloxone, 2 tablet, Sublingual, Daily  citalopram, 40 mg, Oral, Daily  clopidogrel, 75 mg, Oral, Daily  enoxaparin, 40 mg, Subcutaneous, Q24H  LORazepam, 1 mg, Oral, Once  methylnaltrexone, 12 mg, Subcutaneous, Every Other Day  pantoprazole, 40 mg, Oral, Q AM  polyethylene glycol, 17 g, Oral, Daily  sodium chloride, 10 mL, Intravenous, Q12H  terazosin, 5 mg, Oral, Nightly      Continuous Infusions:   PRN Meds:.•  albuterol sulfate HFA  •  methocarbamol  •  sodium chloride  •  sodium chloride    Assessment/Plan   Assessment & Plan     Active Hospital Problems    Diagnosis  POA   • **Epigastric pain [R10.13]  Yes   • Acute chest pain [R07.9]  Yes   • AAA (abdominal aortic aneurysm) (CMS/HCC) [I71.4]  Yes   • Coronary artery disease involving native coronary artery with hx of MI and PCI [I25.10]  Yes   • Hyperlipidemia [E78.5]  Yes   • Hypertension  [I10]  Yes   • Tobacco abuse [Z72.0]  Yes   • GERD (gastroesophageal reflux disease) [K21.9]  Yes      Resolved Hospital Problems   No resolved problems to display.        Brief Hospital Course to date:  John Chávez is a 55 y.o. male  with a history of GERD, hepatitis C, hyperlipidemia, hypertension, CAD s/p stents, presents to the ED with complaints of intermittent epigastric pain for the past 2 weeks that worsened over the past 2 days.  Patient also reports constipation for the past 2 weeks and has been taking an over-the-counter laxative daily in order to have a bowel movement.  Last bowel movement was yesterday.  He reports his pain is sharp, and radiates from his epigastric area down his stomach and around to his back.       chart reviewed and updated.    Plan:     Epigastric abdominal pain with some rad to back  - CT noted: AAA.    -- EGD w mild gastritis only  - GB US unremarkable  - discussed with GI:  HIDA and KUB ordered.     Chest pain  HTN  HLD  CAD s/p stents  --troponins neg   - Ekg nl       AAA  --CT A/P shows 4.8 cm infrarenal abdominal aortic aneurysm  - pt says this was diagnosed at [facility] some years ago  - discuss w CTSurg given rad to back and unrewarding GI eval thus far.   - needs serial followup     Constip:  New  - BM this morning, liquid, after mag citrate      DVT Prophylaxis:  [facility]       Disposition: I expect the patient to be discharged tbd    CODE STATUS:   Code Status and Medical Interventions:   Ordered at: 02/01/21 2111     Level Of Support Discussed With:    Patient     Code Status:    CPR     Medical Interventions (Level of Support Prior to Arrest):    Full       Domi Marina MD  02/04/21

## 2021-02-04 NOTE — PLAN OF CARE
Problem: Pain Acute  Goal: Optimal Pain Control  Outcome: Ongoing, Progressing  Intervention: Develop Pain Management Plan  Recent Flowsheet Documentation  Taken 2/4/2021 1600 by Teresa Lazcano RN  Pain Management Interventions:   pillow support provided   position adjusted   no interventions per patient request  Taken 2/4/2021 1400 by Teresa Lazcano RN  Pain Management Interventions: no interventions per patient request  Taken 2/4/2021 1200 by Teresa Lazcano RN  Pain Management Interventions: pillow support provided  Taken 2/4/2021 0900 by Teresa Lazcano RN  Pain Management Interventions: see MAR  Taken 2/4/2021 0757 by Teresa Lazcano RN  Pain Management Interventions:   pain management plan reviewed with patient/caregiver   no interventions per patient request  Intervention: Prevent or Manage Pain  Recent Flowsheet Documentation  Taken 2/4/2021 1600 by Teresa Lazcano RN  Sensory Stimulation Regulation: quiet environment promoted  Sleep/Rest Enhancement:   consistent schedule promoted   regular sleep/rest pattern promoted   relaxation techniques promoted   awakenings minimized  Taken 2/4/2021 1400 by Teresa Lazcano RN  Sensory Stimulation Regulation:   quiet environment promoted   television on  Sleep/Rest Enhancement:   consistent schedule promoted   awakenings minimized   regular sleep/rest pattern promoted   relaxation techniques promoted  Taken 2/4/2021 1200 by Teresa Lazcano RN  Sensory Stimulation Regulation: quiet environment promoted  Sleep/Rest Enhancement:   consistent schedule promoted   awakenings minimized   regular sleep/rest pattern promoted   relaxation techniques promoted  Taken 2/4/2021 1000 by Teresa Lazcano RN  Sensory Stimulation Regulation: quiet environment promoted  Sleep/Rest Enhancement:   consistent schedule promoted   regular sleep/rest pattern promoted   relaxation techniques promoted   awakenings minimized  Taken 2/4/2021 0757 by Teresa Lazcano RN  Sensory Stimulation Regulation:  quiet environment promoted  Sleep/Rest Enhancement:   consistent schedule promoted   awakenings minimized   regular sleep/rest pattern promoted   relaxation techniques promoted  Intervention: Optimize Psychosocial Wellbeing  Recent Flowsheet Documentation  Taken 2/4/2021 1600 by Teresa Lazcano RN  Supportive Measures:   active listening utilized   verbalization of feelings encouraged   self-care encouraged   relaxation techniques promoted   problem-solving facilitated   positive reinforcement provided  Diversional Activities:   television   smartphone  Taken 2/4/2021 1400 by Teresa Lazcano, RN  Supportive Measures:   active listening utilized   verbalization of feelings encouraged   self-care encouraged   relaxation techniques promoted   problem-solving facilitated   positive reinforcement provided  Diversional Activities:   television   smartphone  Taken 2/4/2021 1200 by Teresa Lazcano, RN  Supportive Measures:   active listening utilized   verbalization of feelings encouraged   self-care encouraged   relaxation techniques promoted   problem-solving facilitated   positive reinforcement provided  Diversional Activities:   television   smartphone  Taken 2/4/2021 1000 by Teresa Lazcano, RN  Supportive Measures:   active listening utilized   verbalization of feelings encouraged   self-care encouraged   relaxation techniques promoted   problem-solving facilitated   positive reinforcement provided  Diversional Activities:   television   smartphone  Taken 2/4/2021 0757 by Teresa Lazcano, RN  Supportive Measures:   active listening utilized   verbalization of feelings encouraged   self-care encouraged   relaxation techniques promoted   problem-solving facilitated   positive reinforcement provided  Diversional Activities: television     Problem: Adult Inpatient Plan of Care  Goal: Plan of Care Review  Outcome: Ongoing, Progressing  Goal: Patient-Specific Goal (Individualized)  Outcome: Ongoing, Progressing  Goal: Absence of  Hospital-Acquired Illness or Injury  Outcome: Ongoing, Progressing  Intervention: Identify and Manage Fall Risk  Recent Flowsheet Documentation  Taken 2/4/2021 1600 by Teresa Lazcano RN  Safety Promotion/Fall Prevention:   nonskid shoes/slippers when out of bed   room organization consistent   safety round/check completed   clutter free environment maintained   assistive device/personal items within reach  Taken 2/4/2021 1400 by Teresa Lazcano RN  Safety Promotion/Fall Prevention:   assistive device/personal items within reach   clutter free environment maintained   room organization consistent   safety round/check completed   nonskid shoes/slippers when out of bed  Taken 2/4/2021 1200 by Teresa Lazcano RN  Safety Promotion/Fall Prevention:   safety round/check completed   room organization consistent   nonskid shoes/slippers when out of bed   clutter free environment maintained   assistive device/personal items within reach  Taken 2/4/2021 1000 by Teresa Lazcano RN  Safety Promotion/Fall Prevention:   activity supervised   assistive device/personal items within reach   clutter free environment maintained   nonskid shoes/slippers when out of bed   room organization consistent   safety round/check completed  Taken 2/4/2021 0757 by Teresa Lazcano RN  Safety Promotion/Fall Prevention:   activity supervised   assistive device/personal items within reach   clutter free environment maintained   fall prevention program maintained   nonskid shoes/slippers when out of bed   room organization consistent   safety round/check completed  Intervention: Prevent Skin Injury  Recent Flowsheet Documentation  Taken 2/4/2021 1600 by Teresa Lazcano RN  Body Position: position changed independently  Taken 2/4/2021 1400 by Teresa Lazcano RN  Body Position: position changed independently  Taken 2/4/2021 1200 by Teresa Lazcano RN  Body Position: position changed independently  Taken 2/4/2021 1000 by Teresa Lazcano RN  Body Position:  position changed independently  Taken 2/4/2021 0757 by Teresa Lazcano RN  Body Position: position changed independently  Intervention: Prevent and Manage VTE (venous thromboembolism) Risk  Recent Flowsheet Documentation  Taken 2/4/2021 0757 by Teresa Lazcano RN  VTE Prevention/Management: (SQ lovenox therapy) --  Intervention: Prevent Infection  Recent Flowsheet Documentation  Taken 2/4/2021 1600 by Teresa Lazcano RN  Infection Prevention:   environmental surveillance performed   equipment surfaces disinfected   hand hygiene promoted   rest/sleep promoted   single patient room provided   visitors restricted/screened  Taken 2/4/2021 1400 by Teresa Lazcano RN  Infection Prevention:   environmental surveillance performed   equipment surfaces disinfected   hand hygiene promoted   personal protective equipment utilized   rest/sleep promoted   single patient room provided   visitors restricted/screened  Taken 2/4/2021 1200 by Teresa Lazcano RN  Infection Prevention:   environmental surveillance performed   equipment surfaces disinfected   hand hygiene promoted   rest/sleep promoted   single patient room provided   visitors restricted/screened  Taken 2/4/2021 1000 by Teresa Lazcano RN  Infection Prevention:   environmental surveillance performed   equipment surfaces disinfected   hand hygiene promoted   rest/sleep promoted   visitors restricted/screened   single patient room provided  Taken 2/4/2021 0757 by Teresa Lazcano RN  Infection Prevention:   environmental surveillance performed   equipment surfaces disinfected   hand hygiene promoted   rest/sleep promoted   single patient room provided   visitors restricted/screened  Goal: Optimal Comfort and Wellbeing  Outcome: Ongoing, Progressing  Intervention: Provide Person-Centered Care  Recent Flowsheet Documentation  Taken 2/4/2021 1600 by Teresa Lazcano RN  Trust Relationship/Rapport:   care explained   choices provided   emotional support provided   empathic listening  provided   reassurance provided   questions answered   questions encouraged   thoughts/feelings acknowledged  Taken 2/4/2021 1400 by Teresa Lazcano, RN  Trust Relationship/Rapport:   care explained   choices provided   emotional support provided   empathic listening provided   questions answered   questions encouraged   reassurance provided   thoughts/feelings acknowledged  Taken 2/4/2021 1200 by Teresa Lazcano, RN  Trust Relationship/Rapport:   care explained   choices provided   emotional support provided   empathic listening provided   questions answered   questions encouraged   reassurance provided   thoughts/feelings acknowledged  Taken 2/4/2021 1000 by Teresa Lazcano, RN  Trust Relationship/Rapport:   emotional support provided   care explained   choices provided   empathic listening provided   questions answered   questions encouraged   reassurance provided   thoughts/feelings acknowledged  Taken 2/4/2021 0757 by Teresa Lazcano, RN  Trust Relationship/Rapport:   care explained   choices provided   emotional support provided   empathic listening provided   questions answered   questions encouraged   reassurance provided   thoughts/feelings acknowledged  Goal: Readiness for Transition of Care  Outcome: Ongoing, Progressing     Problem: Constipation  Goal: Effective Bowel Elimination  Outcome: Ongoing, Progressing   Goal Outcome Evaluation:

## 2021-02-04 NOTE — PROGRESS NOTES
"GI Daily Progress Note  Subjective     John Chávez is a 55 y.o. male who was admitted with Epigastric pain.   Still with abd pain.  Has constipation.  He is on Suboxone.  Last colonoscopy was 15 yrs ago    Chief Complaint:  abd pain    Objective     /64 (BP Location: Right arm, Patient Position: Sitting)   Pulse 57   Temp 98.7 °F (37.1 °C) (Oral)   Resp 16   Ht 177.8 cm (70\")   Wt 81.8 kg (180 lb 6.4 oz)   SpO2 90%   BMI 25.88 kg/m²     Intake/Output last 3 shifts:  No intake/output data recorded.  Intake/Output this shift:  I/O this shift:  In: 240 [P.O.:240]  Out: -       Physical Exam  Wt Readings from Last 3 Encounters:   02/01/21 81.8 kg (180 lb 6.4 oz)   01/28/21 80.7 kg (178 lb)   01/20/21 80.7 kg (178 lb)   ,body mass index is 25.88 kg/m².,@FLOWAMB(6)@,@FLOWAMB(5)@,@FLOWAMB(8)@   CONSTITUTIONAL:sitting in chair  Resp CTA; no rhonchi, rales, or wheezes.  Respiration effort normal  CV RRR; no M/R/G. No lower extremity edema  GI Abd soft, NT, ND, normal active bowel sounds.   No chest wall pain or myofascial pain  Psych: Awake alert and oriented    DATA:  RUQ US  FINDINGS: Sonographic grayscale and color Doppler evaluation of the  right upper quadrant demonstrates     Limited visualization of the pancreas is unremarkable.     Homogeneous liver without evidence of focal lesion or biliary ductal  dilatation.     Unremarkable gallbladder without evidence of stones or wall thickening.  Normal 4 mm common bile duct.     Right kidney measures 9.5 cm in length without apparent mass or  hydronephrosis. Normal color Doppler flow.     IMPRESSION:  Normal right upper quadrant ultrasound.     This report was finalized on 2/3/2021 6:32 PM by Eduardo Harris.             Assessment/Plan     Abdominal pain  Constipation, suspect opiate induced.    Mild gastritis   Chronic hepatitis C    HIDA scan tomorrow.  Will need to hold Suboxone    Needs opt screening colonsocopy          Epigastric pain    GERD " (gastroesophageal reflux disease)    Hyperlipidemia    Hypertension    Tobacco abuse    Coronary artery disease involving native coronary artery with hx of MI and PCI    Acute chest pain    AAA (abdominal aortic aneurysm) (CMS/[facility])       LOS: 0 days     Jeancarlos Katz MD  02/04/21  17:32 EST

## 2021-02-04 NOTE — NURSING NOTE
Pleasant, cooperative, verbalizes understanding of POC. VSS, Tele shows a SR. Pain adequately controlled. Given teaching information for HIDA scan tomorrow morning at 0830. Pain meds will be held until after HIDA scan. Pt understands to be NPO after midnight tonight in preparation. UAL/Independent in room.

## 2021-02-04 NOTE — PLAN OF CARE
Goal Outcome Evaluation:  Plan of Care Reviewed With: patient     Outcome Summary: Pt  discussed his inability to move bowels for several days and was given miralax and mg citrate yesterday on days. Pt stated he did have diarrhea last night but I personally did not see his stool. Abdomen remained distended with audible bowel sounds. Pt had no c/o nausea but did have epigastric pain throughout the night. Pt did sleep from 0200hrs until morning.. Overall, an uneventful night.

## 2021-02-05 ENCOUNTER — APPOINTMENT (OUTPATIENT)
Dept: NUCLEAR MEDICINE | Facility: HOSPITAL | Age: 56
End: 2021-02-05

## 2021-02-05 ENCOUNTER — READMISSION MANAGEMENT (OUTPATIENT)
Dept: CALL CENTER | Facility: HOSPITAL | Age: 56
End: 2021-02-05

## 2021-02-05 VITALS
DIASTOLIC BLOOD PRESSURE: 77 MMHG | HEIGHT: 70 IN | OXYGEN SATURATION: 91 % | BODY MASS INDEX: 25.83 KG/M2 | SYSTOLIC BLOOD PRESSURE: 118 MMHG | WEIGHT: 180.4 LBS | HEART RATE: 59 BPM | TEMPERATURE: 98 F | RESPIRATION RATE: 18 BRPM

## 2021-02-05 PROCEDURE — G0378 HOSPITAL OBSERVATION PER HR: HCPCS

## 2021-02-05 PROCEDURE — 99217 PR OBSERVATION CARE DISCHARGE MANAGEMENT: CPT | Performed by: INTERNAL MEDICINE

## 2021-02-05 PROCEDURE — 94799 UNLISTED PULMONARY SVC/PX: CPT

## 2021-02-05 PROCEDURE — A9537 TC99M MEBROFENIN: HCPCS | Performed by: INTERNAL MEDICINE

## 2021-02-05 PROCEDURE — 78227 HEPATOBIL SYST IMAGE W/DRUG: CPT

## 2021-02-05 PROCEDURE — 25010000002 SINCALIDE PER 5 MCG: Performed by: INTERNAL MEDICINE

## 2021-02-05 PROCEDURE — 0 TECHNETIUM TC 99M MEBROFENIN KIT: Performed by: INTERNAL MEDICINE

## 2021-02-05 RX ORDER — KIT FOR THE PREPARATION OF TECHNETIUM TC 99M MEBROFENIN 45 MG/10ML
1 INJECTION, POWDER, LYOPHILIZED, FOR SOLUTION INTRAVENOUS
Status: COMPLETED | OUTPATIENT
Start: 2021-02-05 | End: 2021-02-05

## 2021-02-05 RX ORDER — POLYETHYLENE GLYCOL 3350 17 G/17G
17 POWDER, FOR SOLUTION ORAL DAILY
Qty: 30 EACH | Refills: 1 | Status: SHIPPED | OUTPATIENT
Start: 2021-02-05

## 2021-02-05 RX ORDER — PANTOPRAZOLE SODIUM 40 MG/1
40 TABLET, DELAYED RELEASE ORAL DAILY
Qty: 30 TABLET | Refills: 1 | Status: SHIPPED | OUTPATIENT
Start: 2021-02-05 | End: 2021-04-05 | Stop reason: SDUPTHER

## 2021-02-05 RX ADMIN — SINCALIDE 1.6 MCG: 5 INJECTION, POWDER, LYOPHILIZED, FOR SOLUTION INTRAVENOUS at 10:30

## 2021-02-05 RX ADMIN — BUPRENORPHINE AND NALOXONE 2 TABLET: 8; 2 TABLET SUBLINGUAL at 11:33

## 2021-02-05 RX ADMIN — ATORVASTATIN CALCIUM 40 MG: 40 TABLET, FILM COATED ORAL at 11:33

## 2021-02-05 RX ADMIN — MEBROFENIN 1 DOSE: 45 INJECTION, POWDER, LYOPHILIZED, FOR SOLUTION INTRAVENOUS at 09:33

## 2021-02-05 RX ADMIN — CLOPIDOGREL BISULFATE 75 MG: 75 TABLET ORAL at 11:33

## 2021-02-05 RX ADMIN — ASPIRIN 81 MG: 81 TABLET, COATED ORAL at 11:33

## 2021-02-05 RX ADMIN — POLYETHYLENE GLYCOL 3350 17 G: 17 POWDER, FOR SOLUTION ORAL at 11:33

## 2021-02-05 RX ADMIN — CITALOPRAM HYDROBROMIDE 40 MG: 40 TABLET ORAL at 11:33

## 2021-02-05 NOTE — PROGRESS NOTES
Continued Stay Note  [facility]     Patient Name: John Chávez  MRN: [id number]  Today's Date: 2/5/2021    Admit Date: 2/1/2021    Discharge Plan     Row Name 02/05/21 1110       Plan    Plan  Update    Patient/Family in Agreement with Plan  other (see comments)    Plan Comments  No new DC needs identified at this time.    Final Discharge Disposition Code  01 - home or self-care        Discharge Codes    No documentation.       Expected Discharge Date and Time     Expected Discharge Date Expected Discharge Time    Feb 3, 2021             Maki Mott RN

## 2021-02-05 NOTE — PLAN OF CARE
Goal Outcome Evaluation:  Plan of Care Reviewed With: patient   Pt pleasant and cooperative throughout shift. Pt had HIDA scan today that was negative. CT surgery to look at status of AAA and may schedule a CT angiogram with runoff to assess extent of aortoiliac disease.Per Dr. Marina, pt can go and will f/u OP with CT sx and GI for colonoscopy. VSS, will cont to monitor until d/c.

## 2021-02-05 NOTE — PLAN OF CARE
Goal Outcome Evaluation:  Plan of Care Reviewed With: patient     Outcome Summary: Pt admitted for alcohol abuse and a history of isolated seizure when attempted to withdrawal. He is being monitored clossely for symptoms of withdrawal based on CWAL. He has had a quiet night medicated twice with 1mg ativan IV which has allowed him to sleep all night. He has not had any complaints except for mild headache which abated by this morning. Vitals have been stable . Tele revealed sinus arrythmia , sinus tachy with heart rate increasing to 113 when ambulating to bathroom. Urine was dark yellow so pt may be dry. Overall, he had an uneventfuul night.

## 2021-02-05 NOTE — CONSULTS
Cardiothoracic Surgery History & Physical           Chief complaint: Abdominal pain    HPI:  Patient is a 55-year-old male with a history of coronary artery disease s/p stent in 2015, GERD, hepatitis C, hypertension, and hyperlipidemia whom presented to the ED on 2-1-2021 for complaint of intermittent worsening epigastric abdominal pain.  He also admitted to a long history of lower extremity numbness which he attributed to chronic back pain, as well as complaint of intermittent episodes of shortness of breath, fatigue, lightheadedness, and abdominal distention.  Abdominal CT shows a 4.8 cm infrarenal abdominal aortic aneurysm with no surrounding periaortic edema, as well as short segment severe atherosclerotic narrowing/occlusion of the left common iliac artery with reconstitution of the internal and external branches.  Patient admits that he was made aware of this AAA about a year ago, but was told that it was not large enough to warrant surgery.  Patient cannot recall when his last CT scan was prior to being seen in the ED at this time.  At time of ED admission, troponins x2 was negative.  In the ED, he was given aspirin and Dilaudid.  After admission, gastroenterology was consulted for his complaint of abdominal pain. After examination, there was a suspicion of PUD, and a upper GI scope was obtained which showed mild gastritis, biopsies were obtained to look for H. Pylori.  During his hospital stay, patient complained of severe constipation, and was given MiraLAX.  Today, patient has been NPO. for HIDA scan. CT surgery was consulted for possible AAA repair. He does admit to some mild epigastric abdominal pain today. Denies chest pain, SOA, or N/V/D.       Past Medical History:   Diagnosis Date   • Asthma    • GERD (gastroesophageal reflux disease)    • Hepatitis C    • Hyperlipidemia    • Hypertension    • Myocardial infarction (CMS/HCC)      Past Surgical History:   Procedure Laterality Date   • CARDIAC  CATHETERIZATION N/A    • CORONARY ANGIOPLASTY WITH STENT PLACEMENT N/A    • ENDOSCOPY N/A 2/2/2021    Procedure: ESOPHAGOGASTRODUODENOSCOPY;  Surgeon: Brunner, Mark I, MD;  Location: [facility] ENDOSCOPY;  Service: Gastroenterology;  Laterality: N/A;     Family History   Problem Relation Age of Onset   • Heart disease Father    • Aneurysm Mother    • Arthritis Mother      Social History     Tobacco Use   • Smoking status: Current Every Day Smoker     Packs/day: 0.50   • Smokeless tobacco: Never Used   Substance Use Topics   • Alcohol use: No     Comment: Quit in 2007   • Drug use: No     Comment: 2/3/2017       Medications Prior to Admission   Medication Sig Dispense Refill Last Dose   • Aspirin Low Dose 81 MG EC tablet Take 81 mg by mouth Daily.   2/1/2021 at Unknown time   • atorvastatin (LIPITOR) 40 MG tablet Take 40 mg by mouth every night at bedtime.   1/31/2021 at Unknown time   • clopidogrel (PLAVIX) 75 MG tablet Take 75 mg by mouth Daily.   2/1/2021 at Unknown time   • Fluticasone Furoate-Vilanterol (Breo Ellipta) 100-25 MCG/INH inhaler Inhale 1 puff Daily.   2/1/2021 at Unknown time   • methocarbamol (ROBAXIN) 750 MG tablet Take 1 tablet by mouth 4 (Four) Times a Day As Needed for Muscle Spasms. 60 tablet 0 2/1/2021 at Unknown time   • prazosin (MINIPRESS) 2 MG capsule Take 4 mg by mouth every night at bedtime.   1/31/2021 at Unknown time   • Suboxone 8-2 MG film film DISSOLVE 2 FILM(S) SUBLINGUALLY 1 TIME A DAY      • albuterol sulfate  (90 Base) MCG/ACT inhaler Inhale 2 puffs Every 4 (Four) Hours As Needed for Wheezing. 29 g 11    • citalopram (CeleXA) 40 MG tablet Take 40 mg by mouth Daily.      • D3 Super Strength 50 MCG (2000 UT) capsule Take 2,000 Units by mouth Daily.      • diclofenac (VOLTAREN) 75 MG EC tablet Take 1 tablet by mouth 2 (Two) Times a Day. 60 tablet 5    • Omeprazole 20 MG tablet delayed-release Take 20 mg by mouth Daily. 30 each 2      Allergies:  Codeine    Review of  Systems:    A comprehensive review of systems was negative except for:   Gastrointestinal: positive for epigastric abdominal pain.     All other systems were reviewed and are negative.    Vital Signs:  Temp:  [97.7 °F (36.5 °C)-98.7 °F (37.1 °C)] 98 °F (36.7 °C)  Heart Rate:  [53-77] 59  Resp:  [16-20] 18  BP: (101-131)/(64-82) 105/65    Physical Exam:  Physical Exam  Constitutional:       General: He is not in acute distress.     Appearance: Normal appearance.   HENT:      Head: Atraumatic.      Right Ear: External ear normal.      Left Ear: External ear normal.      Nose: Nose normal.   Eyes:      Extraocular Movements: Extraocular movements intact.      Conjunctiva/sclera: Conjunctivae normal.      Pupils: Pupils are equal, round, and reactive to light.   Neck:      Musculoskeletal: No neck rigidity.   Cardiovascular:      Rate and Rhythm: Normal rate and regular rhythm.      Pulses: Normal pulses.      Heart sounds: Normal heart sounds. No murmur.   Pulmonary:      Effort: Pulmonary effort is normal. No respiratory distress.      Breath sounds: Normal breath sounds. No wheezing, rhonchi or rales.   Abdominal:      General: Bowel sounds are normal. There is no distension.      Tenderness: There is no abdominal tenderness. There is no guarding or rebound.   Musculoskeletal: Normal range of motion.   Skin:     General: Skin is warm and dry.      Coloration: Skin is not jaundiced.      Findings: No lesion or rash.   Neurological:      General: No focal deficit present.      Mental Status: He is alert and oriented to person, place, and time.   Psychiatric:         Attention and Perception: Attention normal.         Mood and Affect: Mood normal.         Behavior: Behavior normal.         Thought Content: Thought content normal.         Labs:  Results from last 7 days   Lab Units 02/03/21  0430   WBC 10*3/mm3 8.18   HEMOGLOBIN g/dL 11.9*   HEMATOCRIT % 36.0*   PLATELETS 10*3/mm3 187     Results from last 7 days   Lab  Units 02/03/21  0430   SODIUM mmol/L 144   POTASSIUM mmol/L 4.3   CHLORIDE mmol/L 107   CO2 mmol/L 28.0   BUN mg/dL 14   CREATININE mg/dL 0.81   GLUCOSE mg/dL 95   CALCIUM mg/dL 9.5         Coagulation: No results found for: INR, APTT  Cardiac markers:   Results from last 7 days   Lab Units 02/02/21  0522   TROPONIN T ng/mL <0.010     ABGs:       Invalid input(s): PO2    Imaging:     EXAMINATION: CT ABDOMEN PELVIS W CONTRAST-      INDICATION: right sided abd pain. hx of aneurysm?     TECHNIQUE: Axial IV contrast-enhanced CT of the abdomen and pelvis with  multiplanar reconstruction     The radiation dose reduction device was turned on for each scan per the  ALARA (As Low as Reasonably Achievable) protocol.     COMPARISON: NONE     FINDINGS: The lung bases are grossly clear. Body wall soft tissues are  unremarkable. The liver, spleen, pancreas and bilateral adrenal glands  demonstrate homogeneous attenuation without evidence of suspicious  lesion. A stable left adrenal nodule from 2017 demonstrates  characteristics most compatible with benign adenoma. Small and large  bowel loops are nondilated. There is no suspicious focal bowel wall  thickening. The appendix is normal. Unremarkable gallbladder. No free  fluid or pneumoperitoneum. The kidneys demonstrate symmetric nephrogram  and contrast excretion without evidence of hydronephrosis or contour  deforming mass. The pelvic viscera are unremarkable. There is no  evidence of acute fracture or aggressive osseous lesion. There is  aneurysmal dilatation of the infrarenal abdominal aorta, with  significant circumferential component of intraluminal thrombus and/or  soft plaque, with maximal dimension 4.8 cm. There is short segment  severe atherosclerotic narrowing or occlusion of the left common iliac  artery, with reconstitution of the external and internal branches. The  visceral branches of the abdominal aorta including the celiac, superior  mesenteric and bilateral renal  artery origins appear grossly patent.  There is no definite adjacent acute edema or other inflammatory signs of  impending rupture.     IMPRESSION:  4.8 cm infrarenal abdominal aortic aneurysm. In the absence  of comparison, evaluation for acute enlargement is limited, however  there is no surrounding periaortic edema currently. There is short  segment severe atherosclerotic narrowing or occlusion of the left common  iliac artery, with reconstitution of the internal and external branches.     No acute findings in the abdomen and pelvis otherwise.    EXAMINATION: US GALLBLADDER-      INDICATION: Abdominal pain; R07.9-Chest pain, unspecified;  R10.9-Unspecified abdominal pain; I71.4-Abdominal aortic aneurysm,  without rupture; R10.13-Epigastric pain      COMPARISON: NONE     FINDINGS: Sonographic grayscale and color Doppler evaluation of the  right upper quadrant demonstrates     Limited visualization of the pancreas is unremarkable.     Homogeneous liver without evidence of focal lesion or biliary ductal  dilatation.     Unremarkable gallbladder without evidence of stones or wall thickening.  Normal 4 mm common bile duct.     Right kidney measures 9.5 cm in length without apparent mass or  hydronephrosis. Normal color Doppler flow.     IMPRESSION:  Normal right upper quadrant ultrasound.      EXAMINATION: XR ABDOMEN KUB- 02/04/2021     INDICATION: Abdominal pain, constipation; R07.9-Chest pain, unspecified;  R10.9-Unspecified abdominal pain; I71.4-Abdominal aortic aneurysm,  without rupture; R10.13-Epigastric pain      COMPARISON: NONE     FINDINGS: KUB reveals stool seen in the ascending colon. The remainder  of the bowel gas pattern is unremarkable. No evidence of obvious  obstruction or gross free intraperitoneal air.       Minimal degenerative changes seen within the spine.  No abnormal mass or  calcification seen within the abdomen. Mild vascular calcifications seen  of the abdominal aorta. Patient is status  post mesh repair of a left  inguinal hernia.        IMPRESSION:  Bowel gas pattern is unremarkable with no obvious  obstruction or gross free intraperitoneal air.            Assessment:   Patient Active Problem List   Diagnosis   • Atypical chest pain   • GERD (gastroesophageal reflux disease)   • Hyperlipidemia   • Hypertension   • Tobacco abuse   • Chronic hepatitis C virus infection (CMS/HCC)   • Mild intermittent asthma with acute exacerbation   • Coronary artery disease involving native coronary artery with hx of MI and PCI   • Medical non-compliance   • Other emphysema (CMS/HCC)   • Acute chest pain   • Epigastric pain   • AAA (abdominal aortic aneurysm) (CMS/HCC)         Plan:   We will try to obtain records from [place] [facility] regarding previous CT scan for comparison of growth of AAA.  If no marked increase in size of AAA, will quest order of follow-up CT of the abdomen in 6 months to reassess growth.  Patient require abdominal CT angiogram with runoff to assess extent of aortoiliac disease.        Donato Eagle PA-C  02/05/21  09:16 EST

## 2021-02-05 NOTE — PLAN OF CARE
Goal Outcome Evaluation:  Plan of Care Reviewed With: patient     Outcome Summary: The above note was sent to IT to delete... Charted on the wrong patient.. MR Chávez has been NPO for HIDA SCAN today. His vitals have been stable except oxygen satsdrop while sleeping into the high 88-89% on RA so oxygen placed at 2liters. Otherwise he has had no complaints all shift.

## 2021-02-06 NOTE — OUTREACH NOTE
Prep Survey      Responses   [facility] patient discharged from?  [place]   Is LACE score < 7 ?  Yes   Emergency Room discharge w/ pulse ox?  No   Eligibility  [facility]   Date of Admission  02/01/21   Date of Discharge  02/05/21   Discharge Disposition  Home or Self Care   Discharge diagnosis  Epigastric pain,  AAA   Does the patient have one of the following disease processes/diagnoses(primary or secondary)?  Other   Does the patient have Home health ordered?  No   Is there a DME ordered?  No   Prep survey completed?  Yes          Cristiane Hanson RN

## 2021-02-08 ENCOUNTER — TRANSITIONAL CARE MANAGEMENT TELEPHONE ENCOUNTER (OUTPATIENT)
Dept: CALL CENTER | Facility: HOSPITAL | Age: 56
End: 2021-02-08

## 2021-02-08 NOTE — OUTREACH NOTE
Call Center TCM Note      Responses   [facility] patient discharged from?  [place]   Does the patient have one of the following disease processes/diagnoses(primary or secondary)?  Other   TCM attempt successful?  Yes   Call start time  1105   Call end time  1117   Discharge diagnosis  Epigastric pain,  AAA   Is patient permission given to speak with other caregiver?  No   Meds reviewed with patient/caregiver?  Yes   Is the patient having any side effects they believe may be caused by any medication additions or changes?  No   Does the patient have all medications ordered at discharge?  Yes   Is the patient taking all medications as directed (includes completed medication regime)?  Yes   Comments regarding appointments  New Patient with [facility] Thursday Feb 11, 2021 2:45 PM   Does the patient have a primary care provider?   Yes   Does the patient have an appointment with their PCP within 7 days of discharge?  Yes   Comments regarding PCP  PCP Eduardo Jiménez MD. Hospital follow up scheduled for 2/10/21  11am   Has the patient kept scheduled appointments due by today?  N/A   Comments  Patient to call Dr White office today to make an appt for a colonoscopy for 6 weeks.    Has home health visited the patient within 72 hours of discharge?  N/A   Psychosocial issues?  No   Did the patient receive a copy of their discharge instructions?  Yes   Nursing interventions  Reviewed instructions with patient   What is the patient's perception of their health status since discharge?  Same [Patient reports continued issues with constipation. ]   Is the patient/caregiver able to teach back signs and symptoms related to disease process for when to call PCP?  Yes   Is the patient/caregiver able to teach back signs and symptoms related to disease process for when to call 911?  Yes   Is the patient/caregiver able to teach back the hierarchy of who to call/visit for symptoms/problems? PCP,  Specialist, Home health nurse, Urgent Care, ED, 911  Yes   TCM call completed?  Yes          Romana Keyes RN    2/8/2021, 11:17 EST

## 2021-02-22 ENCOUNTER — TELEPHONE (OUTPATIENT)
Dept: FAMILY MEDICINE CLINIC | Facility: CLINIC | Age: 56
End: 2021-02-22

## 2021-03-03 ENCOUNTER — TELEPHONE (OUTPATIENT)
Dept: FAMILY MEDICINE CLINIC | Facility: CLINIC | Age: 56
End: 2021-03-03

## 2021-03-10 ENCOUNTER — OFFICE VISIT (OUTPATIENT)
Dept: FAMILY MEDICINE CLINIC | Facility: CLINIC | Age: 56
End: 2021-03-10

## 2021-03-10 ENCOUNTER — TELEPHONE (OUTPATIENT)
Dept: FAMILY MEDICINE CLINIC | Facility: CLINIC | Age: 56
End: 2021-03-10

## 2021-03-10 VITALS
BODY MASS INDEX: 25.2 KG/M2 | HEIGHT: 70 IN | OXYGEN SATURATION: 98 % | DIASTOLIC BLOOD PRESSURE: 78 MMHG | HEART RATE: 87 BPM | SYSTOLIC BLOOD PRESSURE: 122 MMHG | WEIGHT: 176 LBS

## 2021-03-10 DIAGNOSIS — M54.16 LUMBAR RADICULOPATHY: ICD-10-CM

## 2021-03-10 DIAGNOSIS — R20.2 NUMBNESS AND TINGLING OF LEFT LEG: Primary | ICD-10-CM

## 2021-03-10 DIAGNOSIS — R20.0 NUMBNESS AND TINGLING OF LEFT LEG: Primary | ICD-10-CM

## 2021-03-10 PROCEDURE — 99213 OFFICE O/P EST LOW 20 MIN: CPT | Performed by: INTERNAL MEDICINE

## 2021-03-10 NOTE — PROGRESS NOTES
"John Chávez  1965  [id number]  Patient Care Team:  Eduardo Jiménez MD as PCP - General (Internal Medicine)  Diony Ochoa MD as Consulting Physician (General Surgery)    John Chávez is a 55 y.o. male here today for follow up.     This patient is accompanied by their self who contributes to the history of their care.    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   He still having left-sided numbness worse with ambulating.  Left hip pain and back pain persist.  I did send an orthopedic because I could not tell on repeated exams with the whether this was hip or back.  No weakness.  On a positive note he is going to be returning to .  Leg is numb from the hip to the foot.  Again no no weakness.  Still with back pain.  Perineal numbness or tingling.  No bowel or bladder issues.    Review of Systems:    Review of Systems   Constitutional: Negative.    HENT: Negative.    Respiratory: Negative.    Cardiovascular: Negative.    Endocrine: Negative.    Genitourinary: Negative.    Allergic/Immunologic: Negative.    Neurological: Positive for numbness.       Vitals:    03/10/21 1014   BP: 122/78   Pulse: 87   SpO2: 98%   Weight: 79.8 kg (176 lb)   Height: 177.8 cm (70\")     Body mass index is 25.25 kg/m².      Current Outpatient Medications:   •  albuterol sulfate  (90 Base) MCG/ACT inhaler, Inhale 2 puffs Every 4 (Four) Hours As Needed for Wheezing., Disp: 29 g, Rfl: 11  •  Aspirin Low Dose 81 MG EC tablet, Take 81 mg by mouth Daily., Disp: , Rfl:   •  atorvastatin (LIPITOR) 40 MG tablet, Take 40 mg by mouth every night at bedtime., Disp: , Rfl:   •  " Fluticasone Furoate-Vilanterol (Breo Ellipta) 100-25 MCG/INH inhaler, Inhale 1 puff Daily., Disp: , Rfl:   •  pantoprazole (PROTONIX) 40 MG EC tablet, Take 1 tablet by mouth Daily., Disp: 30 tablet, Rfl: 1  •  polyethylene glycol (MIRALAX) 17 g packet, Take 17 g by mouth Daily., Disp: 30 each, Rfl: 1  •  Suboxone 8-2 MG film film, DISSOLVE 2 FILM(S) SUBLINGUALLY 1 TIME A DAY, Disp: , Rfl:     Physical Exam:    Physical Exam  Vitals and nursing note reviewed.   Constitutional:       General: He is not in acute distress.     Appearance: He is well-developed. He is not diaphoretic.   HENT:      Head: Normocephalic and atraumatic.      Right Ear: External ear normal.      Left Ear: External ear normal.      Mouth/Throat:      Pharynx: No oropharyngeal exudate.   Eyes:      General: No scleral icterus.        Right eye: No discharge.      Conjunctiva/sclera: Conjunctivae normal.   Neck:      Thyroid: No thyromegaly.      Vascular: No JVD.      Trachea: No tracheal deviation.   Cardiovascular:      Rate and Rhythm: Normal rate and regular rhythm.      Heart sounds: Normal heart sounds.      Comments: PMI nondisplaced  Pulmonary:      Effort: Pulmonary effort is normal.      Breath sounds: Normal breath sounds. No wheezing or rales.   Abdominal:      General: Bowel sounds are normal.      Palpations: Abdomen is soft.      Tenderness: There is no abdominal tenderness. There is no guarding or rebound.   Musculoskeletal:         General: Normal range of motion.      Cervical back: Normal range of motion and neck supple.      Comments: Normal gait.  He does have limited range of motion with spine (lumbar (AP flexion extension.  Is patellar reflexes are 1+ on the left and 2+ on the right.   No Foot drop   Lymphadenopathy:      Cervical: No cervical adenopathy.   Skin:     General: Skin is warm and dry.      Capillary Refill: Capillary refill takes less than 2 seconds.      Coloration: Skin is not pale.      Findings: No rash.    Neurological:      Mental Status: He is alert and oriented to person, place, and time.      Motor: No abnormal muscle tone.      Coordination: Coordination normal.   Psychiatric:         Judgment: Judgment normal.         Procedures    Results Review:    I reviewed the patient's new clinical results.    Assessment/Plan:  He has been evaluated by orthopedic feels that the bulk of his pain is probably lumbosacral in nature.  Will order MRI, consider referral for injections.  Exercises provided  Problem List Items Addressed This Visit        Neuro    Lumbar radiculopathy      Other Visit Diagnoses     Numbness and tingling of left leg    -  Primary    Relevant Orders    MRI Lumbar Spine Without Contrast          Plan of care reviewed with patient at the conclusion of today's visit. Education was provided regarding diagnosis and management.  Patient verbalizes understanding of and agreement with management plan.    Return in about 3 months (around 6/10/2021), or back pain.    Eduardo Jiménez MD    Please note that portions of this note may have been completed with a voice recognition program. Efforts were made to edit the dictations, but occasionally words are mistranscribed.

## 2021-03-10 NOTE — TELEPHONE ENCOUNTER
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      PLEASE CONTACT PATIENT TO CONFIRM FAX HAS BEEN SENT.    [phone]

## 2021-03-11 NOTE — TELEPHONE ENCOUNTER
Dr. Jiménez signed medical reconcilliation form, it has been completed and faxed. Contacted patient to update him, he verbalized understanding and thanked us

## 2021-04-05 NOTE — TELEPHONE ENCOUNTER
Caller: John Chávez    Relationship: Self    Best call back number: [phone]    Medication needed:   Requested Prescriptions     Pending Prescriptions Disp Refills   • pantoprazole (PROTONIX) 40 MG EC tablet 30 tablet 1     Sig: Take 1 tablet by mouth Daily.       When do you need the refill by: 04/05/21    What additional details did the patient provide when requesting the medication: PATIENT ONLY HAS TWO PILLS LEFT.     Does the patient have less than a 3 day supply:  [x] Yes  [] No    What is the patient's preferred pharmacy: [id number] [address] AT [facility] [phone] [facility] [phone]

## 2021-04-06 RX ORDER — PANTOPRAZOLE SODIUM 40 MG/1
40 TABLET, DELAYED RELEASE ORAL DAILY
Qty: 30 TABLET | Refills: 1 | Status: SHIPPED | OUTPATIENT
Start: 2021-04-06 | End: 2021-06-04

## 2021-04-24 ENCOUNTER — OFFICE VISIT (OUTPATIENT)
Dept: FAMILY MEDICINE CLINIC | Facility: CLINIC | Age: 56
End: 2021-04-24

## 2021-04-24 VITALS
HEART RATE: 82 BPM | SYSTOLIC BLOOD PRESSURE: 130 MMHG | BODY MASS INDEX: 26.83 KG/M2 | WEIGHT: 187.4 LBS | HEIGHT: 70 IN | DIASTOLIC BLOOD PRESSURE: 76 MMHG | OXYGEN SATURATION: 97 %

## 2021-04-24 DIAGNOSIS — M54.16 LUMBAR RADICULOPATHY: ICD-10-CM

## 2021-04-24 DIAGNOSIS — R63.5 WEIGHT GAIN: ICD-10-CM

## 2021-04-24 DIAGNOSIS — K59.00 CONSTIPATION, UNSPECIFIED CONSTIPATION TYPE: ICD-10-CM

## 2021-04-24 DIAGNOSIS — R14.0 ABDOMINAL BLOATING: Primary | ICD-10-CM

## 2021-04-24 DIAGNOSIS — B18.2 CHRONIC HEPATITIS C WITHOUT HEPATIC COMA (HCC): ICD-10-CM

## 2021-04-24 PROCEDURE — 99214 OFFICE O/P EST MOD 30 MIN: CPT | Performed by: PHYSICIAN ASSISTANT

## 2021-04-24 RX ORDER — CHLORHEXIDINE GLUCONATE 0.12 MG/ML
RINSE ORAL
COMMUNITY
Start: 2021-04-21 | End: 2022-07-05

## 2021-04-24 RX ORDER — IBUPROFEN 600 MG/1
600 TABLET ORAL EVERY 6 HOURS PRN
COMMUNITY
Start: 2021-03-24 | End: 2021-10-09

## 2021-04-24 RX ORDER — ACETAMINOPHEN 325 MG/1
TABLET ORAL
COMMUNITY
Start: 2021-03-24 | End: 2021-04-24

## 2021-04-24 NOTE — PROGRESS NOTES
"    Chief Complaint   Patient presents with   • Flank Pain     Left side. Pt states has been going on for quite sometime now.       HPI     John Chávez is a pleasant 55 y.o. male with PMH of CAD, HTN, AAA, chronic hepatitis C, asthma, and tobacco use. who presents for evaluation of \"chief complaint.\"     He c/o of ongoing left hip and leg pain radiating into groin for 1 year which is his primary concern today. Leg goes numb intermittently. He saw Dr. Jiménez on 3/10 when an MRI of his lumbar spine was ordered. This was scheduled scheduled on 4/1 but the patient was unaware of this so he did not go. Feels symptoms are worsening. Denies fever, bladder/bowel incontinence, saddle anesthesia.     He is staying constipated. Using miralax daily which is starting to help but feels bloated all the time for months. Better after a BM but not alleviated. BM once every 2 days, admits straining. He denies abdominal pain. He has gained 11 pounds in 6 weeks without a change in diet or exercise. Feels this is starting to affect his breathing. His hepatitis C has not been treated. Remote hx of IV drug use, denies recent use.    Past Medical History:   Diagnosis Date   • Asthma    • GERD (gastroesophageal reflux disease)    • Hepatitis C    • Hyperlipidemia    • Hypertension    • Myocardial infarction (CMS/HCC)        Past Surgical History:   Procedure Laterality Date   • CARDIAC CATHETERIZATION N/A    • CORONARY ANGIOPLASTY WITH STENT PLACEMENT N/A    • ENDOSCOPY N/A 2/2/2021    Procedure: ESOPHAGOGASTRODUODENOSCOPY;  Surgeon: Brunner, Mark I, MD;  Location: [facility];  Service: Gastroenterology;  Laterality: N/A;       Family History   Problem Relation Age of Onset   • Heart disease Father    • Aneurysm Mother    • Arthritis Mother        Social History     Socioeconomic History   • Marital status:      Spouse name: Not on file   • Number of children: Not on file   • Years of education: Not on file   • Highest " education level: Not on file   Tobacco Use   • Smoking status: Current Every Day Smoker     Packs/day: 0.50   • Smokeless tobacco: Never Used   Vaping Use   • Vaping Use: Every day   • Substances: Nicotine   • Devices: Pre-filled or refillable cartridge   Substance and Sexual Activity   • Alcohol use: No     Comment: Quit in 2007   • Drug use: No     Comment: 2/3/2017   • Sexual activity: Defer       Allergies   Allergen Reactions   • Codeine Nausea And Vomiting       ROS    Review of Systems   Constitutional: Positive for unexpected weight gain. Negative for chills and fever.   Gastrointestinal: Positive for abdominal distention and constipation. Negative for abdominal pain, diarrhea and vomiting.   Genitourinary: Negative for urinary incontinence.   Musculoskeletal: Positive for arthralgias and back pain.   Neurological: Positive for numbness.       Vitals:    04/24/21 0943   BP: 130/76   Pulse: 82   SpO2: 97%     Body mass index is 26.89 kg/m².      Current Outpatient Medications:   •  albuterol sulfate  (90 Base) MCG/ACT inhaler, Inhale 2 puffs Every 4 (Four) Hours As Needed for Wheezing., Disp: 29 g, Rfl: 11  •  Aspirin Low Dose 81 MG EC tablet, Take 81 mg by mouth Daily., Disp: , Rfl:   •  atorvastatin (LIPITOR) 40 MG tablet, Take 40 mg by mouth every night at bedtime., Disp: , Rfl:   •  chlorhexidine (PERIDEX) 0.12 % solution, , Disp: , Rfl:   •  Fluticasone Furoate-Vilanterol (Breo Ellipta) 100-25 MCG/INH inhaler, Inhale 1 puff Daily., Disp: , Rfl:   •  ibuprofen (ADVIL,MOTRIN) 600 MG tablet, Take 600 mg by mouth Every 6 (Six) Hours As Needed. for pain, Disp: , Rfl:   •  pantoprazole (PROTONIX) 40 MG EC tablet, Take 1 tablet by mouth Daily., Disp: 30 tablet, Rfl: 1  •  polyethylene glycol (MIRALAX) 17 g packet, Take 17 g by mouth Daily., Disp: 30 each, Rfl: 1  •  Suboxone 8-2 MG film film, DISSOLVE 2 FILM(S) SUBLINGUALLY 1 TIME A DAY, Disp: , Rfl:     PE    Physical Exam  Vitals reviewed.    Constitutional:       General: He is not in acute distress.     Appearance: He is well-developed.   HENT:      Head: Normocephalic and atraumatic.   Eyes:      General: No scleral icterus.     Conjunctiva/sclera: Conjunctivae normal.   Cardiovascular:      Rate and Rhythm: Normal rate and regular rhythm.      Heart sounds: Normal heart sounds. No murmur heard.     Pulmonary:      Effort: Pulmonary effort is normal.      Breath sounds: Normal breath sounds.   Abdominal:      General: Bowel sounds are normal.      Palpations: Abdomen is soft.      Tenderness: There is no abdominal tenderness. There is guarding. There is no rebound.      Comments: Bowel sounds present all 4 quadrants. Mild distention without fluid wave. Difficult exam due to guarding   Musculoskeletal:      Cervical back: Normal range of motion.      Lumbar back: No tenderness.      Right hip: Normal. No tenderness. Normal range of motion.      Left hip: Normal. No tenderness. Normal range of motion.      Right lower leg: No edema.      Left lower leg: No edema.   Skin:     General: Skin is warm and dry.   Neurological:      Mental Status: He is alert.      Gait: Gait normal.      Comments: BLE strength 5/5. BLE reflexes difficult to elicit but symmetric. Negative SLR on left   Psychiatric:         Speech: Speech normal.         Behavior: Behavior normal.          A/P    Problem List Items Addressed This Visit        Gastrointestinal Abdominal     Chronic hepatitis C virus infection (CMS/HCC)  -Hx IV drug use, denies current drug/alcohol use  -Order CBC, CMP, HCV RNA       Neuro    Lumbar radiculopathy  -Order lumbar MRI without contrast      Other Visit Diagnoses     Abdominal bloating    -  Primary  -Start daily fiber for constipation and continue Miralax daily  -Hx wt gain, ultrasound to evaluate for ascites   -Known AAA, keep appointment with CTS 8/10/21  -Further management pending results of testing  -RTC in 2 weeks for follow-up with   Jiménez\      Constipation, unspecified constipation type        Weight gain              Plan of care was reviewed with patient at the conclusion of today's visit. Education was provided regarding diagnoses, management, prescribed or recommended OTC products, and the importance of compliance with follow-up appointments. The patient was counseled regarding the risks, benefits, and possible side-effects of treatment. I advised the patient to keep me informed of any acute changes in their status including new, worsening, or persistent symptoms. Patient expresses understanding and agreement with the management plan.        DENZEL Kennedy

## 2021-04-24 NOTE — PATIENT INSTRUCTIONS
-Go to the [facility] diagnostic [place] lab located at [address] [place] [address] [facility] for blood work. They are open from 8 AM to 4:15 PM Monday through Friday. You do not need an appointment  -Start daily fiber such as metamucil, benefiber, fiber choice, or fibercon over the counter for constipation      Constipation, Adult  Constipation is when a person has fewer than three bowel movements in a week, has difficulty having a bowel movement, or has stools (feces) that are dry, hard, or larger than normal. Constipation may be caused by an underlying condition. It may become worse with age if a person takes certain medicines and does not take in enough fluids.  Follow these instructions at home:  Eating and drinking    · Eat foods that have a lot of fiber, such as beans, whole grains, and fresh fruits and vegetables.  · Limit foods that are low in fiber and high in fat and processed sugars, such as fried or sweet foods. These include french fries, hamburgers, cookies, candies, and soda.  · Drink enough fluid to keep your urine pale yellow.  General instructions  · Exercise regularly or as told by your health care provider. Try to do 150 minutes of moderate exercise each week.  · Use the bathroom when you have the urge to go. Do not hold it in.  · Take over-the-counter and prescription medicines only as told by your health care provider. This includes any fiber supplements.  · During bowel movements:  ? Practice deep breathing while relaxing the lower abdomen.  ? Practice pelvic floor relaxation.  · Watch your condition for any changes. Let your health care provider know about them.  · Keep all follow-up visits as told by your health care provider. This is important.  Contact a health care provider if:  · You have pain that gets worse.  · You have a fever.  · You do not have a bowel movement after 4 days.  · You vomit.  · You are not hungry or you lose weight.  · You are bleeding from the opening between the buttocks  (anus).  · You have thin, pencil-like stools.  Get help right away if:  · You have a fever and your symptoms suddenly get worse.  · You leak stool or have blood in your stool.  · Your abdomen is bloated.  · You have severe pain in your abdomen.  · You feel dizzy or you faint.  Summary  · Constipation is when a person has fewer than three bowel movements in a week, has difficulty having a bowel movement, or has stools (feces) that are dry, hard, or larger than normal.  · Eat foods that have a lot of fiber, such as beans, whole grains, and fresh fruits and vegetables.  · Drink enough fluid to keep your urine pale yellow.  · Take over-the-counter and prescription medicines only as told by your health care provider. This includes any fiber supplements.  This information is not intended to replace advice given to you by your health care provider. Make sure you discuss any questions you have with your health care provider.  Document Revised: 11/04/2020 Document Reviewed: 11/04/2020  Elsevier Patient Education © 2021 [employer]

## 2021-04-29 ENCOUNTER — TELEPHONE (OUTPATIENT)
Dept: FAMILY MEDICINE CLINIC | Facility: CLINIC | Age: 56
End: 2021-04-29

## 2021-04-29 LAB
ALBUMIN SERPL-MCNC: 4.6 G/DL (ref 3.5–5.2)
ALBUMIN/GLOB SERPL: 2.1 G/DL
ALP SERPL-CCNC: 87 U/L (ref 39–117)
ALT SERPL-CCNC: 17 U/L (ref 1–41)
AST SERPL-CCNC: 17 U/L (ref 1–40)
BILIRUB SERPL-MCNC: 0.4 MG/DL (ref 0–1.2)
BUN SERPL-MCNC: 11 MG/DL (ref 6–20)
BUN/CREAT SERPL: 12.4 (ref 7–25)
CALCIUM SERPL-MCNC: 10 MG/DL (ref 8.6–10.5)
CHLORIDE SERPL-SCNC: 103 MMOL/L (ref 98–107)
CO2 SERPL-SCNC: 27.4 MMOL/L (ref 22–29)
CREAT SERPL-MCNC: 0.89 MG/DL (ref 0.76–1.27)
ERYTHROCYTE [DISTWIDTH] IN BLOOD BY AUTOMATED COUNT: 13.7 % (ref 12.3–15.4)
GLOBULIN SER CALC-MCNC: 2.2 GM/DL
GLUCOSE SERPL-MCNC: 113 MG/DL (ref 65–99)
HCT VFR BLD AUTO: 39.3 % (ref 37.5–51)
HCV RNA SERPL NAA+PROBE-ACNC: NORMAL IU/ML
HGB BLD-MCNC: 13.6 G/DL (ref 13–17.7)
INR PPP: 0.95 (ref 0.85–1.16)
MCH RBC QN AUTO: 31.1 PG (ref 26.6–33)
MCHC RBC AUTO-ENTMCNC: 34.6 G/DL (ref 31.5–35.7)
MCV RBC AUTO: 89.7 FL (ref 79–97)
PLATELET # BLD AUTO: 235 10*3/MM3 (ref 140–450)
POTASSIUM SERPL-SCNC: 4.9 MMOL/L (ref 3.5–5.2)
PROT SERPL-MCNC: 6.8 G/DL (ref 6–8.5)
PROTHROMBIN TIME: 12.4 SECONDS (ref 11.4–14.4)
RBC # BLD AUTO: 4.38 10*6/MM3 (ref 4.14–5.8)
SODIUM SERPL-SCNC: 141 MMOL/L (ref 136–145)
TEST INFORMATION: NORMAL
WBC # BLD AUTO: 10.11 10*3/MM3 (ref 3.4–10.8)

## 2021-04-29 NOTE — TELEPHONE ENCOUNTER
Caller: John Chávez    Relationship: Self    Best call back number: [phone]    What test was performed: LABS     When was the test performed:  EITHER 04/26 OR 04/27    Where was the test performed: [facility]     Additional notes: PATIENT STATES THAT HE IS CURIOUS TO KNOW IF ANYTHING WAS DISCOVERED FROM HIS RESULTS.

## 2021-04-29 NOTE — TELEPHONE ENCOUNTER
Called and spoke with pt. Informed pt per Dr. Jiménez, all blood work was okay and reminded pt to make sure he goes and gets MRI done. Pt stated they called him today to set that up. Pt verbalized understanding and has no further questions at this time.

## 2021-05-08 ENCOUNTER — TELEPHONE (OUTPATIENT)
Dept: FAMILY MEDICINE CLINIC | Facility: CLINIC | Age: 56
End: 2021-05-08

## 2021-05-10 ENCOUNTER — TELEPHONE (OUTPATIENT)
Dept: FAMILY MEDICINE CLINIC | Facility: CLINIC | Age: 56
End: 2021-05-10

## 2021-05-10 NOTE — TELEPHONE ENCOUNTER
Pt has an infection and wants abx to last until the 14.    Infection is jaw, swollen due to tooth.  He said the tooth is gone and I advised we usually do not mouth issues and defer to dentist.      I advised I would still send you this as he does not have a dentist.

## 2021-05-10 NOTE — TELEPHONE ENCOUNTER
PATIENT IS REQUESTING A CALLBACK ABOUT GETTING A NEW MEDICATION. HE SAID HE WOULD RATHER DISCUSS IT WITH HIS PCP.    CONTACT: [phone]

## 2021-05-13 ENCOUNTER — TELEPHONE (OUTPATIENT)
Dept: FAMILY MEDICINE CLINIC | Facility: CLINIC | Age: 56
End: 2021-05-13

## 2021-05-13 NOTE — TELEPHONE ENCOUNTER
PT  Called today asking about his two referrals. One for an MRI and another for radiology for his abdomen. He would like a call back to discuss both of those further.

## 2021-05-15 ENCOUNTER — TELEPHONE (OUTPATIENT)
Dept: FAMILY MEDICINE CLINIC | Facility: CLINIC | Age: 56
End: 2021-05-15

## 2021-05-15 RX ORDER — ALBUTEROL SULFATE 90 UG/1
2 AEROSOL, METERED RESPIRATORY (INHALATION) EVERY 4 HOURS PRN
Qty: 36 G | Refills: 0 | Status: SHIPPED | OUTPATIENT
Start: 2021-05-15 | End: 2022-06-24

## 2021-05-15 NOTE — TELEPHONE ENCOUNTER
Call from answering service: patient needing refill albuterol inhaler. Uses every 4 hours, using breo. No change in symptoms and denies fever, chills, chest pain. Has follow-up with Dr. Jiménez 5/28. Refilled inhaler.

## 2021-05-16 RX ORDER — ALBUTEROL SULFATE 90 UG/1
2 AEROSOL, METERED RESPIRATORY (INHALATION) EVERY 4 HOURS PRN
Qty: 29 G | Refills: 11 | Status: SHIPPED | OUTPATIENT
Start: 2021-05-16 | End: 2022-06-24

## 2021-05-18 ENCOUNTER — APPOINTMENT (OUTPATIENT)
Dept: ULTRASOUND IMAGING | Facility: HOSPITAL | Age: 56
End: 2021-05-18

## 2021-05-18 ENCOUNTER — APPOINTMENT (OUTPATIENT)
Dept: MRI IMAGING | Facility: HOSPITAL | Age: 56
End: 2021-05-18

## 2021-06-04 RX ORDER — PANTOPRAZOLE SODIUM 40 MG/1
40 TABLET, DELAYED RELEASE ORAL DAILY
Qty: 90 TABLET | Refills: 1 | Status: SHIPPED | OUTPATIENT
Start: 2021-06-04 | End: 2022-06-24 | Stop reason: SDUPTHER

## 2021-06-07 DIAGNOSIS — I71.40 ABDOMINAL AORTIC ANEURYSM (AAA) WITHOUT RUPTURE (HCC): Primary | ICD-10-CM

## 2021-06-11 RX ORDER — ALBUTEROL SULFATE 90 UG/1
AEROSOL, METERED RESPIRATORY (INHALATION)
Qty: 36 G | Refills: 0 | OUTPATIENT
Start: 2021-06-11

## 2021-10-09 ENCOUNTER — APPOINTMENT (OUTPATIENT)
Dept: GENERAL RADIOLOGY | Facility: HOSPITAL | Age: 56
End: 2021-10-09

## 2021-10-09 ENCOUNTER — HOSPITAL ENCOUNTER (EMERGENCY)
Facility: HOSPITAL | Age: 56
Discharge: HOME | End: 2021-10-09
Attending: EMERGENCY MEDICINE | Admitting: EMERGENCY MEDICINE

## 2021-10-09 VITALS
TEMPERATURE: 98 F | OXYGEN SATURATION: 94 % | RESPIRATION RATE: 16 BRPM | WEIGHT: 186 LBS | HEIGHT: 70 IN | DIASTOLIC BLOOD PRESSURE: 89 MMHG | HEART RATE: 63 BPM | BODY MASS INDEX: 26.63 KG/M2 | SYSTOLIC BLOOD PRESSURE: 119 MMHG

## 2021-10-09 DIAGNOSIS — I71.40 ABDOMINAL AORTIC ANEURYSM (AAA) WITHOUT RUPTURE (HCC): Primary | ICD-10-CM

## 2021-10-09 DIAGNOSIS — R10.13 EPIGASTRIC ABDOMINAL PAIN: ICD-10-CM

## 2021-10-09 LAB
ALBUMIN SERPL-MCNC: 4.6 G/DL (ref 3.5–5.2)
ALBUMIN/GLOB SERPL: 1.6 G/DL
ALP SERPL-CCNC: 90 U/L (ref 39–117)
ALT SERPL W P-5'-P-CCNC: 25 U/L (ref 1–41)
ANION GAP SERPL CALCULATED.3IONS-SCNC: 12 MMOL/L (ref 5–15)
AST SERPL-CCNC: 27 U/L (ref 1–40)
BASOPHILS # BLD AUTO: 0.09 10*3/MM3 (ref 0–0.2)
BASOPHILS NFR BLD AUTO: 1.1 % (ref 0–1.5)
BILIRUB SERPL-MCNC: 0.3 MG/DL (ref 0–1.2)
BILIRUB UR QL STRIP: NEGATIVE
BUN SERPL-MCNC: 14 MG/DL (ref 6–20)
BUN/CREAT SERPL: 15.6 (ref 7–25)
CALCIUM SPEC-SCNC: 9.6 MG/DL (ref 8.6–10.5)
CHLORIDE SERPL-SCNC: 103 MMOL/L (ref 98–107)
CLARITY UR: CLEAR
CO2 SERPL-SCNC: 25 MMOL/L (ref 22–29)
COLOR UR: YELLOW
CREAT SERPL-MCNC: 0.9 MG/DL (ref 0.76–1.27)
DEPRECATED RDW RBC AUTO: 48.5 FL (ref 37–54)
EOSINOPHIL # BLD AUTO: 0.35 10*3/MM3 (ref 0–0.4)
EOSINOPHIL NFR BLD AUTO: 4.1 % (ref 0.3–6.2)
ERYTHROCYTE [DISTWIDTH] IN BLOOD BY AUTOMATED COUNT: 14.2 % (ref 12.3–15.4)
GFR SERPL CREATININE-BSD FRML MDRD: 87 ML/MIN/1.73
GLOBULIN UR ELPH-MCNC: 2.9 GM/DL
GLUCOSE SERPL-MCNC: 99 MG/DL (ref 65–99)
GLUCOSE UR STRIP-MCNC: NEGATIVE MG/DL
HCT VFR BLD AUTO: 42.5 % (ref 37.5–51)
HGB BLD-MCNC: 14.5 G/DL (ref 13–17.7)
HGB UR QL STRIP.AUTO: NEGATIVE
HOLD SPECIMEN: NORMAL
IMM GRANULOCYTES # BLD AUTO: 0.04 10*3/MM3 (ref 0–0.05)
IMM GRANULOCYTES NFR BLD AUTO: 0.5 % (ref 0–0.5)
KETONES UR QL STRIP: ABNORMAL
LEUKOCYTE ESTERASE UR QL STRIP.AUTO: NEGATIVE
LIPASE SERPL-CCNC: 13 U/L (ref 13–60)
LYMPHOCYTES # BLD AUTO: 2.33 10*3/MM3 (ref 0.7–3.1)
LYMPHOCYTES NFR BLD AUTO: 27.3 % (ref 19.6–45.3)
MCH RBC QN AUTO: 31.6 PG (ref 26.6–33)
MCHC RBC AUTO-ENTMCNC: 34.1 G/DL (ref 31.5–35.7)
MCV RBC AUTO: 92.6 FL (ref 79–97)
MONOCYTES # BLD AUTO: 0.85 10*3/MM3 (ref 0.1–0.9)
MONOCYTES NFR BLD AUTO: 9.9 % (ref 5–12)
NEUTROPHILS NFR BLD AUTO: 4.89 10*3/MM3 (ref 1.7–7)
NEUTROPHILS NFR BLD AUTO: 57.1 % (ref 42.7–76)
NITRITE UR QL STRIP: NEGATIVE
NRBC BLD AUTO-RTO: 0 /100 WBC (ref 0–0.2)
PH UR STRIP.AUTO: 6.5 [PH] (ref 5–8)
PLATELET # BLD AUTO: 263 10*3/MM3 (ref 140–450)
PMV BLD AUTO: 10.3 FL (ref 6–12)
POTASSIUM SERPL-SCNC: 4.2 MMOL/L (ref 3.5–5.2)
PROT SERPL-MCNC: 7.5 G/DL (ref 6–8.5)
PROT UR QL STRIP: NEGATIVE
RBC # BLD AUTO: 4.59 10*6/MM3 (ref 4.14–5.8)
SODIUM SERPL-SCNC: 140 MMOL/L (ref 136–145)
SP GR UR STRIP: 1.02 (ref 1–1.03)
TROPONIN T SERPL-MCNC: <0.01 NG/ML (ref 0–0.03)
UROBILINOGEN UR QL STRIP: ABNORMAL
WBC # BLD AUTO: 8.55 10*3/MM3 (ref 3.4–10.8)
WHOLE BLOOD HOLD SPECIMEN: NORMAL
WHOLE BLOOD HOLD SPECIMEN: NORMAL

## 2021-10-09 PROCEDURE — 80053 COMPREHEN METABOLIC PANEL: CPT | Performed by: EMERGENCY MEDICINE

## 2021-10-09 PROCEDURE — 85025 COMPLETE CBC W/AUTO DIFF WBC: CPT | Performed by: EMERGENCY MEDICINE

## 2021-10-09 PROCEDURE — 93005 ELECTROCARDIOGRAM TRACING: CPT | Performed by: EMERGENCY MEDICINE

## 2021-10-09 PROCEDURE — 99284 EMERGENCY DEPT VISIT MOD MDM: CPT

## 2021-10-09 PROCEDURE — 71045 X-RAY EXAM CHEST 1 VIEW: CPT

## 2021-10-09 PROCEDURE — 83690 ASSAY OF LIPASE: CPT | Performed by: EMERGENCY MEDICINE

## 2021-10-09 PROCEDURE — 99283 EMERGENCY DEPT VISIT LOW MDM: CPT

## 2021-10-09 PROCEDURE — 84484 ASSAY OF TROPONIN QUANT: CPT | Performed by: EMERGENCY MEDICINE

## 2021-10-09 PROCEDURE — 81003 URINALYSIS AUTO W/O SCOPE: CPT | Performed by: EMERGENCY MEDICINE

## 2021-10-09 RX ORDER — SODIUM CHLORIDE 0.9 % (FLUSH) 0.9 %
10 SYRINGE (ML) INJECTION AS NEEDED
Status: DISCONTINUED | OUTPATIENT
Start: 2021-10-09 | End: 2021-10-09 | Stop reason: HOSPADM

## 2021-10-09 NOTE — DISCHARGE INSTRUCTIONS
Call Dr Jiménez as well as Dr zimmer on Monday morning to arrange follow up.  You may return here anytime if you feel like things are changing significantly.  Continue to work with Dr. Jiménez regarding your constipation.  Return if you have fainting spells or significantly worsening pain in your abdomen, stop taking ibuprofen, continue Protonix.

## 2021-10-09 NOTE — ED PROVIDER NOTES
Subjective   Mr. Chávez presents with concerns about his abdominal aneurysm.  He has a known abdominal aortic aneurysm.  He tells me last time it was imaged was about 5 or 6 months ago when it was 4.6 cm.  He has had increasing abdominal swelling over the last several weeks.  He went to the emergency department in [place] a few nights ago and tells me they measured it at 4.9 cm.  He tells me he went to [facility] emergency department earlier today and they did not do any additional imaging.  He tells me they told him to come to our emergency department to see a surgeon.  He tells me it does not feel any different now than it did when he had it imaged at [place].  He is mostly concerned about the swelling.  I ask about pain he tells me that if he eats he has early satiety and develops pain in his epigastric area.  He has history of coronary artery disease and has had stenting.  He has history of drug abuse and has chronic hepatitis C and is maintained on Suboxone.  He has chronic low back pain.      History provided by:  Patient  Abdominal Pain  Pain location:  Epigastric  Pain quality: dull    Pain radiates to:  Does not radiate  Pain severity:  Moderate  Onset quality:  Gradual  Timing:  Intermittent  Chronicity:  Recurrent  Relieved by:  Nothing  Worsened by:  Eating  Associated symptoms: constipation and nausea    Associated symptoms: no chest pain, no chills, no cough, no fever, no shortness of breath and no vomiting        Review of Systems   Constitutional: Negative for chills and fever.   Respiratory: Negative for cough and shortness of breath.    Cardiovascular: Negative for chest pain.   Gastrointestinal: Positive for abdominal pain, constipation and nausea. Negative for vomiting.   All other systems reviewed and are negative.      Past Medical History:   Diagnosis Date   • Asthma    • GERD (gastroesophageal reflux disease)    • Hepatitis C    • Hyperlipidemia    • Hypertension    • Myocardial  infarction (CMS/HCC)        Allergies   Allergen Reactions   • Codeine Nausea And Vomiting       Past Surgical History:   Procedure Laterality Date   • CARDIAC CATHETERIZATION N/A    • CORONARY ANGIOPLASTY WITH STENT PLACEMENT N/A    • ENDOSCOPY N/A 2/2/2021    Procedure: ESOPHAGOGASTRODUODENOSCOPY;  Surgeon: Brunner, Mark I, MD;  Location: [facility] ENDOSCOPY;  Service: Gastroenterology;  Laterality: N/A;       Family History   Problem Relation Age of Onset   • Heart disease Father    • Aneurysm Mother    • Arthritis Mother        Social History     Socioeconomic History   • Marital status:    Tobacco Use   • Smoking status: Current Every Day Smoker     Packs/day: 0.50   • Smokeless tobacco: Never Used   Vaping Use   • Vaping Use: Every day   • Substances: Nicotine   • Devices: Pre-filled or refillable cartridge   Substance and Sexual Activity   • Alcohol use: No     Comment: Quit in 2007   • Drug use: No     Comment: 2/3/2017   • Sexual activity: Defer           Objective   Physical Exam  Vitals and nursing note reviewed.   Constitutional:       General: He is not in acute distress.     Appearance: He is well-developed.   HENT:      Head: Normocephalic and atraumatic.   Eyes:      General: No scleral icterus.  Cardiovascular:      Rate and Rhythm: Normal rate and regular rhythm.      Heart sounds: No murmur heard.  No friction rub.   Pulmonary:      Effort: Pulmonary effort is normal.      Breath sounds: Normal breath sounds. No wheezing or rales.   Abdominal:      General: Abdomen is flat. Bowel sounds are normal.      Palpations: Abdomen is soft.      Tenderness: There is abdominal tenderness.      Comments: He has mild epigastric tenderness.  Overall belly is soft and there is no rebound   Skin:     General: Skin is warm and dry.      Capillary Refill: Capillary refill takes less than 2 seconds.   Neurological:      General: No focal deficit present.      Mental Status: He is alert and oriented to person,  place, and time.      Motor: No weakness.   Psychiatric:         Mood and Affect: Mood normal.         Behavior: Behavior normal.         Procedures           ED Course  ED Course as of 10/09/21 1254   Sat Oct 09, 2021   1236 I have reviewed our records and in March of this year we perform CT scan at which point his aneurysm was measured at 4.8 cm.  I reviewed his records and it looks like he has had problems in the past with abdominal bloating and distention.  He is hemodynamically stable and currently not having any pain.  His description of the pain does not fit with expanding or ruptured aneurysm.  I will try to get a copy of the study done in [place] but it does not sound like there is any significant change in that and I suspect he has been referred to follow-up with a CT surgeon on his multiple prior emergency department visits to other hospitals. [DT]   1249 I reviewed the CT angiogram from [place] on the seventh.  I measured at 4.9.  They did not see any leakage or anything acute appearing.  I reviewed records from [facility].  He was not told to come straight here to see a surgeon.  He was told to follow-up with Dr. Fair.  I pointed this out to him and he remains comfortable and hemodynamically stable.  Will discharge [DT]      ED Course User Index  [DT] Drew Magaña MD                                           MDM  Number of Diagnoses or Management Options  Epigastric abdominal pain: new and requires workup     Amount and/or Complexity of Data Reviewed  Clinical lab tests: reviewed and ordered  Tests in the radiology section of CPT®: reviewed  Review and summarize past medical records: yes        Final diagnoses:   Abdominal aortic aneurysm (AAA) without rupture (HCC)   Epigastric abdominal pain       ED Disposition  ED Disposition     ED Disposition Condition Comment    Discharge Stable           Óscar Fair MD  [address] [address]  [facility]  [address]  [phone]          Eduardo Jiménez MD  [address] [facility] [address]  [phone]               Medication List      Stop    ibuprofen 600 MG tablet  Commonly known as: SAHRA MACKENZIE Dirk B, MD  10/09/21 1254

## 2021-10-18 LAB
QT INTERVAL: 420 MS
QTC INTERVAL: 446 MS

## 2022-06-24 ENCOUNTER — OFFICE VISIT (OUTPATIENT)
Dept: INTERNAL MEDICINE | Facility: CLINIC | Age: 57
End: 2022-06-24

## 2022-06-24 ENCOUNTER — TELEPHONE (OUTPATIENT)
Dept: INTERNAL MEDICINE | Facility: CLINIC | Age: 57
End: 2022-06-24

## 2022-06-24 ENCOUNTER — LAB (OUTPATIENT)
Dept: LAB | Facility: HOSPITAL | Age: 57
End: 2022-06-24

## 2022-06-24 VITALS
BODY MASS INDEX: 28.72 KG/M2 | HEIGHT: 70 IN | HEART RATE: 82 BPM | WEIGHT: 200.6 LBS | DIASTOLIC BLOOD PRESSURE: 88 MMHG | TEMPERATURE: 96.4 F | SYSTOLIC BLOOD PRESSURE: 130 MMHG | OXYGEN SATURATION: 97 %

## 2022-06-24 DIAGNOSIS — F51.01 PRIMARY INSOMNIA: ICD-10-CM

## 2022-06-24 DIAGNOSIS — J44.9 CHRONIC OBSTRUCTIVE PULMONARY DISEASE, UNSPECIFIED COPD TYPE: Primary | ICD-10-CM

## 2022-06-24 DIAGNOSIS — M79.89 LEG SWELLING: ICD-10-CM

## 2022-06-24 DIAGNOSIS — L30.9 ECZEMA, UNSPECIFIED TYPE: ICD-10-CM

## 2022-06-24 DIAGNOSIS — I25.10 CORONARY ARTERY DISEASE WITHOUT ANGINA PECTORIS, UNSPECIFIED VESSEL OR LESION TYPE, UNSPECIFIED WHETHER NATIVE OR TRANSPLANTED HEART: ICD-10-CM

## 2022-06-24 DIAGNOSIS — Z12.11 ENCOUNTER FOR SCREENING FOR MALIGNANT NEOPLASM OF COLON: ICD-10-CM

## 2022-06-24 DIAGNOSIS — K21.9 GERD WITHOUT ESOPHAGITIS: ICD-10-CM

## 2022-06-24 DIAGNOSIS — F33.0 MILD EPISODE OF RECURRENT MAJOR DEPRESSIVE DISORDER: ICD-10-CM

## 2022-06-24 DIAGNOSIS — E78.2 MIXED HYPERLIPIDEMIA: ICD-10-CM

## 2022-06-24 PROCEDURE — 99214 OFFICE O/P EST MOD 30 MIN: CPT | Performed by: INTERNAL MEDICINE

## 2022-06-24 RX ORDER — PANTOPRAZOLE SODIUM 40 MG/1
40 TABLET, DELAYED RELEASE ORAL DAILY
Qty: 90 TABLET | Refills: 1 | Status: SHIPPED | OUTPATIENT
Start: 2022-06-24

## 2022-06-24 RX ORDER — COVID-19 ANTIGEN TEST
KIT MISCELLANEOUS
COMMUNITY
Start: 2022-05-07

## 2022-06-24 RX ORDER — BUPRENORPHINE HYDROCHLORIDE AND NALOXONE HYDROCHLORIDE 5.7; 1.4 MG/1; MG/1
TABLET, ORALLY DISINTEGRATING SUBLINGUAL
COMMUNITY
Start: 2022-05-21

## 2022-06-24 RX ORDER — CITALOPRAM 10 MG/1
10 TABLET ORAL DAILY
Qty: 30 TABLET | Refills: 2 | Status: SHIPPED | OUTPATIENT
Start: 2022-06-24 | End: 2022-07-29 | Stop reason: SDUPTHER

## 2022-06-24 RX ORDER — BUPRENORPHINE HYDROCHLORIDE AND NALOXONE HYDROCHLORIDE DIHYDRATE 8; 2 MG/1; MG/1
TABLET SUBLINGUAL
COMMUNITY
Start: 2022-06-18

## 2022-06-24 RX ORDER — TRIAMCINOLONE ACETONIDE 0.25 MG/G
1 CREAM TOPICAL 2 TIMES DAILY PRN
Qty: 30 G | Refills: 0 | Status: SHIPPED | OUTPATIENT
Start: 2022-06-24 | End: 2022-07-01

## 2022-06-24 RX ORDER — ATORVASTATIN CALCIUM 40 MG/1
40 TABLET, FILM COATED ORAL
Qty: 90 TABLET | Refills: 2 | Status: SHIPPED | OUTPATIENT
Start: 2022-06-24

## 2022-06-24 RX ORDER — ALBUTEROL SULFATE 90 UG/1
2 AEROSOL, METERED RESPIRATORY (INHALATION) EVERY 4 HOURS PRN
Qty: 29 G | Refills: 11 | Status: SHIPPED | OUTPATIENT
Start: 2022-06-24

## 2022-06-24 RX ORDER — CLOPIDOGREL BISULFATE 75 MG/1
75 TABLET ORAL DAILY
Qty: 30 TABLET | Refills: 2 | Status: SHIPPED | OUTPATIENT
Start: 2022-06-24 | End: 2022-09-29

## 2022-06-24 NOTE — TELEPHONE ENCOUNTER
PT STATES THE ALBUTEROL INHALER WASN'T CALLED INTO THE [facility], ALSO PT STATES THE TRELEGY ISN'T COVERED BY HIS INSURANCE AND WANTS SOMETHING ELSE CALLED IN FOR HIM, PLEASE ADVISE.

## 2022-06-24 NOTE — PROGRESS NOTES
Office Note      Date: 2022  Patient Name: John Chávez  MRN: [id number]  : 1965    Chief Complaint   Patient presents with   • Establish Care     Medication refills on everything  Referral to cardiology  Problems with legs swelling       History of Present Illness: John Chávez is a 56 y.o. male who presents for Establish Care (Medication refills on everything/Referral to cardiology/Problems with legs swelling).     Has been on Plavix.  History of cardiac stent.  Needs referral to cardiology as well.  Needs refills of Lipitor.  Wants to try Trelegy for COPD.  Has rash on bilateral hands that cracks and bleeds.  Has depression and previously on Celexa 40mg, would like to go back onto this.  Has acid reflux well controlled with Protonix.  Has bilateral leg swelling.  Notes he eats [employer].  Having difficulty staying asleep.  Has not tried anything symptoms      Subjective      Review of Systems:   Pertinent review of systems per HPI.    Review of Systems   Constitutional: Negative for activity change, appetite change, chills, diaphoresis and fatigue.   HENT: Negative for congestion, dental problem, drooling, ear discharge, ear pain, facial swelling and hearing loss.    Eyes: Negative for photophobia, pain, discharge, redness, itching and visual disturbance.   Respiratory: Negative for cough, choking, chest tightness and shortness of breath.    Cardiovascular: Positive for leg swelling. Negative for chest pain and palpitations.   Endocrine: Negative for cold intolerance, heat intolerance, polydipsia and polyuria.   Genitourinary: Negative for difficulty urinating, dysuria, flank pain, frequency and hematuria.   Musculoskeletal: Negative for arthralgias and back pain.   Skin: Negative for color change, pallor, rash and wound.   Allergic/Immunologic: Negative for environmental allergies.   Neurological: Negative for dizziness, facial asymmetry, light-headedness and headaches.  "  Psychiatric/Behavioral: Negative.    All other systems reviewed and are negative.    Allergies   Allergen Reactions   • Codeine Nausea And Vomiting       Objective     Physical Exam:  Vital Signs:   Vitals:    06/24/22 1106   BP: 130/88   Pulse: 82   Temp: 96.4 °F (35.8 °C)   SpO2: 97%   Weight: 91 kg (200 lb 9.6 oz)   Height: 177.8 cm (70\")      Body mass index is 28.78 kg/m².    Physical Exam  Vitals and nursing note reviewed.   Constitutional:       General: He is not in acute distress.     Appearance: He is well-developed.   HENT:      Head: Normocephalic and atraumatic.      Right Ear: External ear normal.      Left Ear: External ear normal.   Eyes:      General: No scleral icterus.        Right eye: No discharge.         Left eye: No discharge.      Conjunctiva/sclera: Conjunctivae normal.   Cardiovascular:      Rate and Rhythm: Normal rate and regular rhythm.      Heart sounds: Normal heart sounds. No murmur heard.    No friction rub. No gallop.   Pulmonary:      Effort: Pulmonary effort is normal. No respiratory distress.      Breath sounds: Normal breath sounds. No wheezing or rales.   Skin:     General: Skin is warm and dry.      Coloration: Skin is not pale.      Comments: Dry cracked skin on flexural folds of bilateral hands         Assessment / Plan      Assessment & Plan:    1. Chronic obstructive pulmonary disease, unspecified COPD type (formerly [facility])  Rx for Trelegy  - Fluticasone-Umeclidin-Vilant (TRELEGY) 100-62.5-25 MCG/INH inhaler; Inhale 1 puff Daily. Rinse mouth afterward  Dispense: 1 each; Refill: 5    2. Coronary artery disease without angina pectoris, unspecified vessel or lesion type, unspecified whether native or transplanted heart  Chronic medical issue.  Rx Plavix.  Continue aspirin.  Check lipid panel.  - clopidogrel (Plavix) 75 MG tablet; Take 1 tablet by mouth Daily.  Dispense: 30 tablet; Refill: 2  - Ambulatory Referral to Cardiology    3. Primary insomnia  Treat underlying depression to " see if this helps with insomnia    4. Mild episode of recurrent major depressive disorder (HCC)  Start on Celexa, follow back up in 4 weeks to reassess symptoms and increase as needed.  - citalopram (CeleXA) 10 MG tablet; Take 1 tablet by mouth Daily.  Dispense: 30 tablet; Refill: 2    5. GERD without esophagitis  Chronic medical issue well-controlled on Protonix, refills given  - pantoprazole (PROTONIX) 40 MG EC tablet; Take 1 tablet by mouth Daily.  Dispense: 90 tablet; Refill: 1    6. Mixed hyperlipidemia  Check lipid panel, LDL goal less than 70  - atorvastatin (LIPITOR) 40 MG tablet; Take 1 tablet by mouth every night at bedtime.  Dispense: 90 tablet; Refill: 2  - Lipid Panel    7. Leg swelling  Discussed causes of leg swelling.  Likely from high salt diet.  Checking renal function, if normal advised compression socks and leg elevation.  Low-salt diet.  Follow cardiology  - Comprehensive Metabolic Panel    8. Encounter for screening for malignant neoplasm of colon    - Ambulatory Referral For Screening Colonoscopy    9. Eczema, unspecified type  Rash on hands is from eczema, advised moisturize hands, use steroid cream that I am prescribing, decreased water exposure  - triamcinolone (KENALOG) 0.025 % cream; Apply 1 application topically to the appropriate area as directed 2 (Two) Times a Day As Needed for Rash (face - no longer than one week) for up to 7 days.  Dispense: 30 g; Refill: 0      Brianda Hand MD  06/24/2022

## 2022-06-25 LAB
ALBUMIN SERPL-MCNC: 4.5 G/DL (ref 3.5–5.2)
ALBUMIN/GLOB SERPL: 1.7 G/DL
ALP SERPL-CCNC: 95 U/L (ref 39–117)
ALT SERPL-CCNC: 33 U/L (ref 1–41)
AST SERPL-CCNC: 24 U/L (ref 1–40)
BILIRUB SERPL-MCNC: 0.2 MG/DL (ref 0–1.2)
BUN SERPL-MCNC: 15 MG/DL (ref 6–20)
BUN/CREAT SERPL: 16.5 (ref 7–25)
CALCIUM SERPL-MCNC: 9.8 MG/DL (ref 8.6–10.5)
CHLORIDE SERPL-SCNC: 103 MMOL/L (ref 98–107)
CHOLEST SERPL-MCNC: 188 MG/DL (ref 0–200)
CO2 SERPL-SCNC: 25.8 MMOL/L (ref 22–29)
CREAT SERPL-MCNC: 0.91 MG/DL (ref 0.76–1.27)
EGFRCR SERPLBLD CKD-EPI 2021: 97.7 ML/MIN/1.73
GLOBULIN SER CALC-MCNC: 2.7 GM/DL
GLUCOSE SERPL-MCNC: 107 MG/DL (ref 65–99)
HDLC SERPL-MCNC: 58 MG/DL (ref 40–60)
LDLC SERPL CALC-MCNC: 98 MG/DL (ref 0–100)
POTASSIUM SERPL-SCNC: 4.6 MMOL/L (ref 3.5–5.2)
PROT SERPL-MCNC: 7.2 G/DL (ref 6–8.5)
SODIUM SERPL-SCNC: 140 MMOL/L (ref 136–145)
TRIGL SERPL-MCNC: 187 MG/DL (ref 0–150)
VLDLC SERPL CALC-MCNC: 32 MG/DL (ref 5–40)

## 2022-07-05 ENCOUNTER — OFFICE VISIT (OUTPATIENT)
Dept: CARDIOLOGY | Facility: HOSPITAL | Age: 57
End: 2022-07-05

## 2022-07-05 VITALS
OXYGEN SATURATION: 97 % | RESPIRATION RATE: 18 BRPM | HEIGHT: 70 IN | WEIGHT: 205.38 LBS | TEMPERATURE: 97.4 F | HEART RATE: 72 BPM | BODY MASS INDEX: 29.4 KG/M2 | DIASTOLIC BLOOD PRESSURE: 67 MMHG | SYSTOLIC BLOOD PRESSURE: 117 MMHG

## 2022-07-05 DIAGNOSIS — I71.40 AAA (ABDOMINAL AORTIC ANEURYSM) WITHOUT RUPTURE: ICD-10-CM

## 2022-07-05 DIAGNOSIS — I25.10 CORONARY ARTERY DISEASE INVOLVING NATIVE CORONARY ARTERY OF NATIVE HEART WITHOUT ANGINA PECTORIS: Primary | ICD-10-CM

## 2022-07-05 DIAGNOSIS — E78.5 HYPERLIPIDEMIA, UNSPECIFIED HYPERLIPIDEMIA TYPE: ICD-10-CM

## 2022-07-05 DIAGNOSIS — R06.09 DYSPNEA ON EXERTION: ICD-10-CM

## 2022-07-05 DIAGNOSIS — R60.0 BILATERAL LEG EDEMA: ICD-10-CM

## 2022-07-05 PROCEDURE — 99214 OFFICE O/P EST MOD 30 MIN: CPT | Performed by: NURSE PRACTITIONER

## 2022-07-05 RX ORDER — SPIRONOLACTONE 25 MG/1
25 TABLET ORAL DAILY
Qty: 30 TABLET | Refills: 1 | Status: SHIPPED | OUTPATIENT
Start: 2022-07-05

## 2022-07-05 NOTE — PROGRESS NOTES
"[facility], [facility] Heart and Vascular    Chief Complaint  Coronary Artery Disease (History of stents.  /Recent reported AAA repair (DD).  /Increased dyspnea and edema.)    Subjective    History of Present Illness {CC  Problem List  Visit  Diagnosis   Encounters  Notes  Medications  Labs  Result Review Imaging  Media :23}     John Chávez presents to [facility] CARDIOLOGY for   History of Present Illness     56-year-old male with history of CAD status postcardiac stents (2015), hyperlipidemia, COPD, depression, GERD, lower extremity edema, Nicotine/vaping use, AAA.     Recently establish care with primary care provider.  Looking to establish care with cardiology.Had been followed by CT [facility] for survellence of AAA.  Did not have last CTA abdomin and pelvis completed. Pt reports having procedure in [place] 2 months ago.     NO CP or pressure.  Increased dyspnea (cessation of cigarettes 4 months ago, now vapes).  NO dizziness, syncope.  Increased legs (for 2 weeks).      Objective     Vital Signs:   Vitals:    07/05/22 0858 07/05/22 0859 07/05/22 0900   BP: 133/78 128/75 117/67   BP Location: Right arm Left arm Left arm   Patient Position: Sitting Standing Sitting   Cuff Size: Adult Adult Adult   Pulse: 69 73 72   Resp:   18   Temp:   97.4 °F (36.3 °C)   TempSrc:   Temporal   SpO2: 96% 98% 97%   Weight:   93.2 kg (205 lb 6 oz)   Height:   177.8 cm (70\")     Body mass index is 29.47 kg/m².  Physical Exam  Vitals reviewed.   Constitutional:       General: He is not in acute distress.     Appearance: Normal appearance.   Cardiovascular:      Rate and Rhythm: Normal rate and regular rhythm.      Pulses:           Radial pulses are 2+ on the right side.        Dorsalis pedis pulses are 2+ on the right side.        Posterior tibial pulses are 2+ on the right side.      Heart sounds: Normal heart sounds.   Pulmonary:      Effort: Pulmonary effort is normal.     "  Breath sounds: Normal breath sounds.   Musculoskeletal:      Right lower leg: Edema (1+ bilaterally) present.      Left lower leg: Edema present.   Skin:     General: Skin is warm and dry.   Neurological:      Mental Status: He is alert.   Psychiatric:         Mood and Affect: Mood normal.         Behavior: Behavior is cooperative.              Result Review  Data Reviewed:{ Labs  Result Review  Imaging  Med Tab  Media :23}     EKG 10/18/2021: Normal sinus rhythm 68 bpm    CT of the abdomen and pelvis 2/1/2022: 4.8 cm infrarenal abdominal aortic aneurysm short segment of severe atherosclerotic narrowing or occlusion of the left common iliac artery with reconstitution of the internal and external branches      Office Visit on 06/24/2022   Component Date Value Ref Range Status   • Glucose 06/24/2022 107 (A) 65 - 99 mg/dL Final   • BUN 06/24/2022 15  6 - 20 mg/dL Final   • Creatinine 06/24/2022 0.91  0.76 - 1.27 mg/dL Final   • EGFR Result 06/24/2022 97.7  >60.0 mL/min/1.73 Final    Comment: National Kidney Foundation and American Society of  Nephrology (ASN) Task Force recommended calculation based  on the Chronic Kidney Disease Epidemiology Collaboration  (CKD-EPI) equation refit without adjustment for race.  GFR Normal >60  Chronic Kidney Disease <60  Kidney Failure <15     • BUN/Creatinine Ratio 06/24/2022 16.5  7.0 - 25.0 Final   • Sodium 06/24/2022 140  136 - 145 mmol/L Final   • Potassium 06/24/2022 4.6  3.5 - 5.2 mmol/L Final   • Chloride 06/24/2022 103  98 - 107 mmol/L Final   • Total CO2 06/24/2022 25.8  22.0 - 29.0 mmol/L Final   • Calcium 06/24/2022 9.8  8.6 - 10.5 mg/dL Final   • Total Protein 06/24/2022 7.2  6.0 - 8.5 g/dL Final   • Albumin 06/24/2022 4.50  3.50 - 5.20 g/dL Final   • Globulin 06/24/2022 2.7  gm/dL Final   • A/G Ratio 06/24/2022 1.7  g/dL Final   • Total Bilirubin 06/24/2022 0.2  0.0 - 1.2 mg/dL Final   • Alkaline Phosphatase 06/24/2022 95  39 - 117 U/L Final   • AST (SGOT) 06/24/2022  24  1 - 40 U/L Final   • ALT (SGPT) 06/24/2022 33  1 - 41 U/L Final   • Total Cholesterol 06/24/2022 188  0 - 200 mg/dL Final    Comment: Cholesterol Reference Ranges  (U.S. Department of Health and Human Services ATP III  Classifications)  Desirable          <200 mg/dL  Borderline High    200-239 mg/dL  High Risk          >240 mg/dL  Triglyceride Reference Ranges  (U.S. Department of Health and Human Services ATP III  Classifications)  Normal           <150 mg/dL  Borderline High  150-199 mg/dL  High             200-499 mg/dL  Very High        >500 mg/dL  HDL Reference Ranges  (U.S. Department of Health and Human Services ATP III  Classifications)  Low     <40 mg/dl (major risk factor for CHD)  High    >60 mg/dl ('negative' risk factor for CHD)  LDL Reference Ranges  (U.S. Department of Health and Human Services ATP III  Classifications)  Optimal          <100 mg/dL  Near Optimal     100-129 mg/dL  Borderline High  130-159 mg/dL  High             160-189 mg/dL  Very High        >189 mg/dL     • Triglycerides 06/24/2022 187 (A) 0 - 150 mg/dL Final   • HDL Cholesterol 06/24/2022 58  40 - 60 mg/dL Final   • VLDL Cholesterol Fred 06/24/2022 32  5 - 40 mg/dL Final   • LDL Chol Calc (NIH) 06/24/2022 98  0 - 100 mg/dL Final                   Assessment and Plan {CC Problem List  Visit Diagnosis  ROS  Review (Popup)  Health Maintenance  Quality  BestPractice  Medications  SmartSets  SnapShot Encounters  Media :23}   1. Coronary artery disease involving coronary bypass graft of native heart without angina pectoris  Reported previous stents  Asa, statin, plavix    - Ambulatory Referral to Cardiology    2. Hyperlipidemia, unspecified hyperlipidemia type  statin    3. Bilateral leg edema    - Adult Transthoracic Echo Complete W/ Cont if Necessary Per Protocol; Future  - spironolactone (ALDACTONE) 25 MG tablet; Take 1 tablet by mouth Daily.  Dispense: 30 tablet; Refill: 1  - Basic Metabolic Panel; Future  -  Ambulatory Referral to Cardiology    4. AAA (abdominal aortic aneurysm) without rupture (HCC)  Reported stent placement, requesting records for review    - Ambulatory Referral to Cardiology    5. Dyspnea on exertion    - Adult Transthoracic Echo Complete W/ Cont if Necessary Per Protocol; Future          Follow Up {Instructions Charge Capture  Follow-up Communications :23}   Return if symptoms worsen or fail to improve.    Patient was given instructions and counseling regarding his condition or for health maintenance advice. Please see specific information pulled into the AVS if appropriate.  Patient was instructed to call the [facility] with any questions, concerns, or worsening symptoms.

## 2022-07-06 ENCOUNTER — DOCUMENTATION (OUTPATIENT)
Dept: CARDIOLOGY | Facility: HOSPITAL | Age: 57
End: 2022-07-06

## 2022-07-06 NOTE — PROGRESS NOTES
Received medical records documenting angioplasty to left common iliac artery, AAA repair and left common iliac artery stenting, 4/1/2022 [facility]

## 2022-07-26 ENCOUNTER — TELEPHONE (OUTPATIENT)
Dept: CARDIOLOGY | Facility: HOSPITAL | Age: 57
End: 2022-07-26

## 2022-07-26 NOTE — TELEPHONE ENCOUNTER
Labs ordered on 7/5/2022 to be completed in 1 to 2 weeks.  I have not received those results.  Please follow-up with patient.

## 2022-07-29 ENCOUNTER — OFFICE VISIT (OUTPATIENT)
Dept: INTERNAL MEDICINE | Facility: CLINIC | Age: 57
End: 2022-07-29

## 2022-07-29 VITALS
DIASTOLIC BLOOD PRESSURE: 78 MMHG | HEIGHT: 70 IN | OXYGEN SATURATION: 97 % | RESPIRATION RATE: 16 BRPM | SYSTOLIC BLOOD PRESSURE: 140 MMHG | BODY MASS INDEX: 30.21 KG/M2 | TEMPERATURE: 97.3 F | WEIGHT: 211 LBS | HEART RATE: 84 BPM

## 2022-07-29 DIAGNOSIS — F33.0 MILD EPISODE OF RECURRENT MAJOR DEPRESSIVE DISORDER: ICD-10-CM

## 2022-07-29 DIAGNOSIS — L30.9 ECZEMA, UNSPECIFIED TYPE: Primary | ICD-10-CM

## 2022-07-29 DIAGNOSIS — K21.9 GERD WITHOUT ESOPHAGITIS: ICD-10-CM

## 2022-07-29 DIAGNOSIS — J44.9 CHRONIC OBSTRUCTIVE PULMONARY DISEASE, UNSPECIFIED COPD TYPE: ICD-10-CM

## 2022-07-29 PROCEDURE — 99214 OFFICE O/P EST MOD 30 MIN: CPT | Performed by: INTERNAL MEDICINE

## 2022-07-29 RX ORDER — CITALOPRAM 20 MG/1
10 TABLET ORAL DAILY
Qty: 30 TABLET | Refills: 2 | Status: SHIPPED | OUTPATIENT
Start: 2022-07-29 | End: 2022-07-29

## 2022-07-29 RX ORDER — CITALOPRAM 20 MG/1
20 TABLET ORAL DAILY
Qty: 30 TABLET | Refills: 3 | Status: SHIPPED | OUTPATIENT
Start: 2022-07-29 | End: 2022-11-28

## 2022-07-29 RX ORDER — NEOMYCIN SULFATE, POLYMYXIN B SULFATE AND DEXAMETHASONE 3.5; 10000; 1 MG/ML; [USP'U]/ML; MG/ML
SUSPENSION/ DROPS OPHTHALMIC
COMMUNITY
Start: 2022-07-16

## 2022-07-29 RX ORDER — CLOBETASOL PROPIONATE 0.5 MG/G
1 OINTMENT TOPICAL 2 TIMES DAILY
Qty: 30 G | Refills: 2 | Status: SHIPPED | OUTPATIENT
Start: 2022-07-29

## 2022-07-29 NOTE — PROGRESS NOTES
"     Office Note      Date: 2022  Patient Name: John Chávez  MRN: [id number]  : 1965    Chief Complaint   Patient presents with   • COPD   • Eye Problem       History of Present Illness: John Chávez is a 56 y.o. male who presents for COPD and Eye Problem. celexa not working. Previously on 20mg dose. Having bloating, on protonix, no constipation. Feels SOB, has been able to get trelegy inhaler. Triamcinolone not helping with rash on hands.     Subjective      Review of Systems:   Pertinent review of systems per HPI.    Review of Systems   Constitutional: Negative for activity change, appetite change, chills, diaphoresis and fatigue.   HENT: Negative for congestion, dental problem, drooling, ear discharge, ear pain, facial swelling and hearing loss.    Eyes: Negative for photophobia, pain, discharge, redness, itching and visual disturbance.   Respiratory: Positive for shortness of breath. Negative for cough, choking and chest tightness.    Cardiovascular: Negative for chest pain, palpitations and leg swelling.   Endocrine: Negative for cold intolerance, heat intolerance, polydipsia and polyuria.   Genitourinary: Negative for difficulty urinating, dysuria, flank pain, frequency and hematuria.   Musculoskeletal: Negative for arthralgias and back pain.   Skin: Negative for color change, pallor, rash and wound.   Allergic/Immunologic: Negative for environmental allergies.   Neurological: Negative for dizziness, facial asymmetry, light-headedness and headaches.   Psychiatric/Behavioral: Negative.    All other systems reviewed and are negative.    Allergies   Allergen Reactions   • Codeine Nausea And Vomiting       Objective     Physical Exam:  Vital Signs:   Vitals:    22 1605   BP: 140/78   Pulse: 84   Resp: 16   Temp: 97.3 °F (36.3 °C)   SpO2: 97%   Weight: 95.7 kg (211 lb)   Height: 177.8 cm (70\")      Body mass index is 30.28 kg/m².    Physical Exam  Vitals and nursing note reviewed. "   Constitutional:       General: He is not in acute distress.     Appearance: He is well-developed.   HENT:      Head: Normocephalic and atraumatic.      Right Ear: External ear normal.      Left Ear: External ear normal.   Eyes:      General: No scleral icterus.        Right eye: No discharge.         Left eye: No discharge.      Conjunctiva/sclera: Conjunctivae normal.   Cardiovascular:      Rate and Rhythm: Normal rate and regular rhythm.      Heart sounds: Normal heart sounds. No murmur heard.    No friction rub. No gallop.   Pulmonary:      Effort: Pulmonary effort is normal. No respiratory distress.      Breath sounds: Normal breath sounds. No wheezing or rales.   Skin:     General: Skin is warm and dry.      Coloration: Skin is not pale.         Assessment / Plan      Assessment & Plan:    1. Eczema, unspecified type  Inc strength of steroid  - clobetasol (TEMOVATE) 0.05 % ointment; Apply 1 application topically to the appropriate area as directed 2 (Two) Times a Day.  Dispense: 30 g; Refill: 2  - Ambulatory Referral to Dermatology    2. Mild episode of recurrent major depressive disorder (HCC)  Inc celexa, f/u 1 month  - citalopram (CeleXA) 20 MG tablet; Take 1 tablet by mouth Daily.  Dispense: 30 tablet; Refill: 3    3. Chronic obstructive pulmonary disease, unspecified COPD type (HCC)  Referral to pulm, tx GERD  - Ambulatory Referral to Pulmonology    4. GERD without esophagitis  Inc protonix to BID      Brianda Hand MD  07/29/2022

## 2022-08-31 RX ORDER — FUROSEMIDE 20 MG/1
TABLET ORAL
COMMUNITY
Start: 2022-08-01

## 2022-09-20 RX ORDER — SODIUM, POTASSIUM,MAG SULFATES 17.5-3.13G
SOLUTION, RECONSTITUTED, ORAL ORAL
Qty: 354 ML | Refills: 0 | Status: SHIPPED | OUTPATIENT
Start: 2022-09-20

## 2022-09-29 DIAGNOSIS — I25.10 CORONARY ARTERY DISEASE WITHOUT ANGINA PECTORIS, UNSPECIFIED VESSEL OR LESION TYPE, UNSPECIFIED WHETHER NATIVE OR TRANSPLANTED HEART: ICD-10-CM

## 2022-09-29 RX ORDER — CLOPIDOGREL BISULFATE 75 MG/1
75 TABLET ORAL DAILY
Qty: 30 TABLET | Refills: 2 | Status: SHIPPED | OUTPATIENT
Start: 2022-09-29

## 2022-11-25 DIAGNOSIS — F33.0 MILD EPISODE OF RECURRENT MAJOR DEPRESSIVE DISORDER: ICD-10-CM

## 2022-11-28 RX ORDER — CITALOPRAM 20 MG/1
20 TABLET ORAL DAILY
Qty: 30 TABLET | Refills: 2 | Status: SHIPPED | OUTPATIENT
Start: 2022-11-28

## 2023-02-14 DIAGNOSIS — R60.0 BILATERAL LEG EDEMA: ICD-10-CM

## 2023-02-14 RX ORDER — SPIRONOLACTONE 25 MG/1
25 TABLET ORAL DAILY
Qty: 30 TABLET | Refills: 0 | OUTPATIENT
Start: 2023-02-14

## 2023-02-14 NOTE — TELEPHONE ENCOUNTER
Last seen 6 months ago.  Did not keep f/u with cardiology.  Pt will need to schedule f/u visit for continued management.  He may see PCP

## 2023-04-04 ENCOUNTER — PATIENT OUTREACH (OUTPATIENT)
Dept: CASE MANAGEMENT | Facility: OTHER | Age: 58
End: 2023-04-04
Payer: MEDICAID

## 2023-04-04 NOTE — OUTREACH NOTE
"AMBULATORY CASE MANAGEMENT NOTE    Name and Relationship of Patient/Support Person: Kyler John Harish - Self    Patient Outreach    Pt contacted regarding [facility] ED visit 4/3/23.  Role of Ambulatory Nurse  explained and number provided.  States he was having some breathing problems.  They did prescribe medicine and states he is \"feeling a little bit better\" today.  Symptoms that would warrant return to ED or to seek medical attention reviewed and v/u. Explained [facility] 24/7 Nurse Call Center and offered number, however he states he does not have anything to write the number at present. His speech was unlabored through conversation.  Offered to assist in scheduling appt with his PCP,which he accepted, however states he does not get off work until 2:30 so needs to be after that time.  He states he is self sufficient and drives himself.  He reports he is compliant with his medicines.  He states his watch monitors his bp.      Care Coordination    Scheduling contacted.  Closed for the day. Will call in am for appt.  Pt states RN-ACM can leave appt details on his voicemail.      He does endorse some financial hardship and explained Ambulatory , however he states he is OK at present.  Denies any transportation issues or food insecurities.     SDOH updated and reviewed with the patient during this program:  Financial Resource Strain: High Risk   • Difficulty of Paying Living Expenses: Hard      Food Insecurity: No Food Insecurity   • Worried About Running Out of Food in the Last Year: Never true   • Ran Out of Food in the Last Year: Never true      Transportation Needs: No Transportation Needs   • Lack of Transportation (Medical): No   • Lack of Transportation (Non-Medical): No      Housing Stability: Low Risk    • Unable to Pay for Housing in the Last Year: No   • Number of Places Lived in the Last Year: 1   • Unstable Housing in the Last Year: No       Louise SILVA  Ambulatory Case " Management    4/4/2023, 16:49 EDT

## 2023-04-05 ENCOUNTER — PATIENT OUTREACH (OUTPATIENT)
Dept: CASE MANAGEMENT | Facility: OTHER | Age: 58
End: 2023-04-05
Payer: MEDICAID

## 2023-04-05 NOTE — OUTREACH NOTE
AMBULATORY CASE MANAGEMENT NOTE    Name and Relationship of Patient/Support Person: John Chávez - Self    Care Coordination    Scheduling contacted and appt made with AMERICA Daniels in PCP office for 4/10/23 at 3:00.  Pt had requested appt time be after 2:30 b/c of his work.    Patient Outreach    Left message with appt details and [facility] 24/7 Nurse Call Center number on his voicemail as he requested.      Louise SILVA  Ambulatory Case Management    4/5/2023, 08:45 EDT

## 2023-04-20 ENCOUNTER — PATIENT OUTREACH (OUTPATIENT)
Dept: CASE MANAGEMENT | Facility: OTHER | Age: 58
End: 2023-04-20
Payer: MEDICAID

## 2023-04-20 NOTE — OUTREACH NOTE
AMBULATORY CASE MANAGEMENT NOTE    Name and Relationship of Patient/Support Person: John Chávez - Self    Patient Outreach    Pt returned call from RN-ACM message left earlier today.  Offered assistance in rescheduling his appt with PCP, but needs to be on Fridays. He does state all is well, just needs to get in for f/u.  Reminded of [facility] 24/7 Nurse Call Center, which he states he has number.     Care Coordination    Scheduling contacted and appt made for 4/28/23 at 3:15 with Dr. Hand.    Pt states this works and voiced appreciation for the call.     Send Education  Questions/Answers    Flowsheet Row Most Recent Value   Other Patient Education/Resources  24/7 [facility] Nurse Call Line, Advanced Care Planning   24/7 Nurse Call Line Education Method Verbal   ACP Education Method Verbal   Advanced Directives: Not Interested At This Time        Louise SILVA  Ambulatory Case Management    4/20/2023, 15:32 EDT

## 2023-06-01 DIAGNOSIS — E78.2 MIXED HYPERLIPIDEMIA: ICD-10-CM

## 2023-06-01 RX ORDER — ATORVASTATIN CALCIUM 40 MG/1
40 TABLET, FILM COATED ORAL
Qty: 90 TABLET | Refills: 0 | Status: SHIPPED | OUTPATIENT
Start: 2023-06-01

## 2023-06-04 ENCOUNTER — HOSPITAL ENCOUNTER (EMERGENCY)
Facility: HOSPITAL | Age: 58
Discharge: HOME | End: 2023-06-04
Attending: EMERGENCY MEDICINE
Payer: MEDICAID

## 2023-06-04 ENCOUNTER — APPOINTMENT (OUTPATIENT)
Dept: GENERAL RADIOLOGY | Facility: HOSPITAL | Age: 58
End: 2023-06-04
Payer: MEDICAID

## 2023-06-04 ENCOUNTER — APPOINTMENT (OUTPATIENT)
Dept: CARDIOLOGY | Facility: HOSPITAL | Age: 58
End: 2023-06-04
Payer: MEDICAID

## 2023-06-04 VITALS
SYSTOLIC BLOOD PRESSURE: 145 MMHG | HEART RATE: 79 BPM | WEIGHT: 215 LBS | TEMPERATURE: 97.3 F | RESPIRATION RATE: 18 BRPM | DIASTOLIC BLOOD PRESSURE: 87 MMHG | HEIGHT: 71 IN | OXYGEN SATURATION: 95 % | BODY MASS INDEX: 30.1 KG/M2

## 2023-06-04 DIAGNOSIS — R60.0 BILATERAL LOWER EXTREMITY EDEMA: Primary | ICD-10-CM

## 2023-06-04 DIAGNOSIS — M71.22 SYNOVIAL CYST OF LEFT POPLITEAL SPACE: ICD-10-CM

## 2023-06-04 DIAGNOSIS — M71.21 SYNOVIAL CYST OF RIGHT POPLITEAL SPACE: ICD-10-CM

## 2023-06-04 LAB
ALBUMIN SERPL-MCNC: 4 G/DL (ref 3.5–5.2)
ALBUMIN/GLOB SERPL: 1.5 G/DL
ALP SERPL-CCNC: 94 U/L (ref 39–117)
ALT SERPL W P-5'-P-CCNC: 39 U/L (ref 1–41)
ANION GAP SERPL CALCULATED.3IONS-SCNC: 13 MMOL/L (ref 5–15)
AST SERPL-CCNC: 28 U/L (ref 1–40)
BASOPHILS # BLD AUTO: 0.09 10*3/MM3 (ref 0–0.2)
BASOPHILS NFR BLD AUTO: 1 % (ref 0–1.5)
BILIRUB SERPL-MCNC: 0.3 MG/DL (ref 0–1.2)
BUN SERPL-MCNC: 10 MG/DL (ref 6–20)
BUN/CREAT SERPL: 14.9 (ref 7–25)
CALCIUM SPEC-SCNC: 9.4 MG/DL (ref 8.6–10.5)
CHLORIDE SERPL-SCNC: 105 MMOL/L (ref 98–107)
CO2 SERPL-SCNC: 22 MMOL/L (ref 22–29)
CREAT SERPL-MCNC: 0.67 MG/DL (ref 0.76–1.27)
DEPRECATED RDW RBC AUTO: 47.3 FL (ref 37–54)
EGFRCR SERPLBLD CKD-EPI 2021: 107.6 ML/MIN/1.73
EOSINOPHIL # BLD AUTO: 0.34 10*3/MM3 (ref 0–0.4)
EOSINOPHIL NFR BLD AUTO: 3.9 % (ref 0.3–6.2)
ERYTHROCYTE [DISTWIDTH] IN BLOOD BY AUTOMATED COUNT: 13.9 % (ref 12.3–15.4)
GLOBULIN UR ELPH-MCNC: 2.6 GM/DL
GLUCOSE SERPL-MCNC: 122 MG/DL (ref 65–99)
HCT VFR BLD AUTO: 38.3 % (ref 37.5–51)
HGB BLD-MCNC: 13.2 G/DL (ref 13–17.7)
IMM GRANULOCYTES # BLD AUTO: 0.1 10*3/MM3 (ref 0–0.05)
IMM GRANULOCYTES NFR BLD AUTO: 1.2 % (ref 0–0.5)
LYMPHOCYTES # BLD AUTO: 1.97 10*3/MM3 (ref 0.7–3.1)
LYMPHOCYTES NFR BLD AUTO: 22.9 % (ref 19.6–45.3)
MCH RBC QN AUTO: 31.9 PG (ref 26.6–33)
MCHC RBC AUTO-ENTMCNC: 34.5 G/DL (ref 31.5–35.7)
MCV RBC AUTO: 92.5 FL (ref 79–97)
MONOCYTES # BLD AUTO: 0.81 10*3/MM3 (ref 0.1–0.9)
MONOCYTES NFR BLD AUTO: 9.4 % (ref 5–12)
NEUTROPHILS NFR BLD AUTO: 5.31 10*3/MM3 (ref 1.7–7)
NEUTROPHILS NFR BLD AUTO: 61.6 % (ref 42.7–76)
NRBC BLD AUTO-RTO: 0 /100 WBC (ref 0–0.2)
NT-PROBNP SERPL-MCNC: <36 PG/ML (ref 0–900)
PLATELET # BLD AUTO: 201 10*3/MM3 (ref 140–450)
PMV BLD AUTO: 10.4 FL (ref 6–12)
POTASSIUM SERPL-SCNC: 3.5 MMOL/L (ref 3.5–5.2)
PROT SERPL-MCNC: 6.6 G/DL (ref 6–8.5)
RBC # BLD AUTO: 4.14 10*6/MM3 (ref 4.14–5.8)
SODIUM SERPL-SCNC: 140 MMOL/L (ref 136–145)
TROPONIN T SERPL HS-MCNC: 11 NG/L
WBC NRBC COR # BLD: 8.62 10*3/MM3 (ref 3.4–10.8)

## 2023-06-04 PROCEDURE — 84484 ASSAY OF TROPONIN QUANT: CPT | Performed by: NURSE PRACTITIONER

## 2023-06-04 PROCEDURE — 93970 EXTREMITY STUDY: CPT | Performed by: INTERNAL MEDICINE

## 2023-06-04 PROCEDURE — 93970 EXTREMITY STUDY: CPT

## 2023-06-04 PROCEDURE — 36415 COLL VENOUS BLD VENIPUNCTURE: CPT

## 2023-06-04 PROCEDURE — 99282 EMERGENCY DEPT VISIT SF MDM: CPT

## 2023-06-04 PROCEDURE — 85025 COMPLETE CBC W/AUTO DIFF WBC: CPT | Performed by: NURSE PRACTITIONER

## 2023-06-04 PROCEDURE — 71045 X-RAY EXAM CHEST 1 VIEW: CPT

## 2023-06-04 PROCEDURE — 80053 COMPREHEN METABOLIC PANEL: CPT | Performed by: NURSE PRACTITIONER

## 2023-06-04 PROCEDURE — 83880 ASSAY OF NATRIURETIC PEPTIDE: CPT | Performed by: NURSE PRACTITIONER

## 2023-06-04 NOTE — DISCHARGE INSTRUCTIONS
Follow-up with Ortho.    Rest, naproxen for pain.  Elevate extremities.    Follow-up with primary care physician.

## 2023-06-04 NOTE — ED PROVIDER NOTES
EMERGENCY DEPARTMENT ENCOUNTER    Pt Name: John Chávez  MRN: [id number]  Pt :   1965  Room Number:  Room/bed info not found  Date of encounter:  2023  PCP: Brianda Hand MD  ED Provider: AMERICA Braswell    Historian: ***    HPI:  Chief Complaint: ***    Context: John Chávez is a 57 y.o. male who presents to the ED c/o ***  HPI     REVIEW OF SYSTEMS  A chief complaint appropriate review of systems was completed and is negative except as noted in the HPI.     PAST MEDICAL HISTORY  Past Medical History:   Diagnosis Date    Asthma     GERD (gastroesophageal reflux disease)     Hepatitis C     Hyperlipidemia     Hypertension     Myocardial infarction        PAST SURGICAL HISTORY  Past Surgical History:   Procedure Laterality Date    CARDIAC CATHETERIZATION N/A     CORONARY ANGIOPLASTY WITH STENT PLACEMENT N/A     CORONARY ANGIOPLASTY WITH STENT PLACEMENT  2022    ENDOSCOPY N/A 2021    Procedure: ESOPHAGOGASTRODUODENOSCOPY;  Surgeon: Brunner, Mark I, MD;  Location: [facility] ENDOSCOPY;  Service: Gastroenterology;  Laterality: N/A;       FAMILY HISTORY  Family History   Problem Relation Age of Onset    Aneurysm Mother     Arthritis Mother     Heart disease Father     No Known Problems Sister     No Known Problems Maternal Grandmother     No Known Problems Maternal Grandfather     No Known Problems Paternal Grandmother     No Known Problems Paternal Grandfather        SOCIAL HISTORY  Social History     Socioeconomic History    Marital status:    Tobacco Use    Smoking status: Former     Packs/day: 0.50     Years: 30.00     Pack years: 15.00     Types: Cigarettes     Quit date:      Years since quittin.4    Smokeless tobacco: Never   Vaping Use    Vaping Use: Every day    Start date: 2022    Substances: Nicotine    Devices: Pre-filled or refillable cartridge   Substance and Sexual Activity    Alcohol use: No     Comment: Quit in     Drug use: No      Comment: 2/3/2017    Sexual activity: Defer       ALLERGIES  Codeine    PHYSICAL EXAM  Physical Exam  ***    LAB RESULTS  Results for orders placed or performed in visit on 06/24/22   Comprehensive Metabolic Panel    Specimen: Blood    Blood  Release to jennifer   Result Value Ref Range    Glucose 107 (H) 65 - 99 mg/dL    BUN 15 6 - 20 mg/dL    Creatinine 0.91 0.76 - 1.27 mg/dL    EGFR Result 97.7 >60.0 mL/min/1.73    BUN/Creatinine Ratio 16.5 7.0 - 25.0    Sodium 140 136 - 145 mmol/L    Potassium 4.6 3.5 - 5.2 mmol/L    Chloride 103 98 - 107 mmol/L    Total CO2 25.8 22.0 - 29.0 mmol/L    Calcium 9.8 8.6 - 10.5 mg/dL    Total Protein 7.2 6.0 - 8.5 g/dL    Albumin 4.50 3.50 - 5.20 g/dL    Globulin 2.7 gm/dL    A/G Ratio 1.7 g/dL    Total Bilirubin 0.2 0.0 - 1.2 mg/dL    Alkaline Phosphatase 95 39 - 117 U/L    AST (SGOT) 24 1 - 40 U/L    ALT (SGPT) 33 1 - 41 U/L   Lipid Panel    Specimen: Blood    Blood  Release to jennifer   Result Value Ref Range    Total Cholesterol 188 0 - 200 mg/dL    Triglycerides 187 (H) 0 - 150 mg/dL    HDL Cholesterol 58 40 - 60 mg/dL    VLDL Cholesterol Fred 32 5 - 40 mg/dL    LDL Chol Calc (NIH) 98 0 - 100 mg/dL       If labs were ordered, I independently reviewed the results and considered them in treating the patient.    RADIOLOGY  No orders to display     [] Radiologist's Report Reviewed:  I ordered and independently reviewed the above noted radiographic studies.  See radiologist's dictation for official interpretation.      PROCEDURES    Procedures    No orders to display       MEDICATIONS GIVEN IN ER    Medications - No data to display    MEDICAL DECISION MAKING, PROGRESS, and CONSULTS   Medical Decision Making      All labs have been independently reviewed by me.  All radiology studies have been reviewed by me and the radiologist dictating the report.  All EKG's have been independently viewed by me.    [] Discussed with radiology regarding test interpretation:    Discussion below represents my  analysis of pertinent findings related to patient's condition, differential diagnosis, treatment plan and final disposition.    Differential diagnosis:  The differential diagnosis associated with the patient's presentation includes: ***    Additional sources  Discussed/ obtained information from independent historians:   [] Spouse  [] Parent  [] Family member  [] Friend  [] EMS   [] Other:  External (non-ED) record review:   [] Inpatient record:   [] Office record:   [] Outpatient record:   [] Prior Outpatient labs:   [] Prior Outpatient radiology:   [] Primary Care record:   [] Outside ED record:   [] Other:   Patient's care impacted by:   [] Diabetes  [] Hypertension  [] Hyperlipidemia  [] Hypothyroidism   [] Coronary Artery Disease   [] COPD   [] Cancer   [] Obesity  [] GERD   [] Tobacco Abuse   [] Substance Abuse    [] Anxiety   [] Depression   [] Other:   Care significantly affected by Social Determinants of Health (housing and economic circumstances, unemployment)    [] Yes     [] No   If yes, Patient's care significantly limited by  Social Determinants of Health including:   [] Inadequate housing   [] Low income   [] Alcoholism and drug addiction in family   [] Problems related to primary support group   [] Unemployment   [] Problems related to employment   [] Other Social Determinants of Health:     Shared decision making:  ***    Orders placed during this visit:  No orders of the defined types were placed in this encounter.      I considered prescription management  with:   [] Pain medication  [] Antiviral  [] Antibiotic   [] Other:   Rationale:  Additional orders considered but not ordered:  The following testing was considered but ultimately not selected after discussion with patient/family:***    ED Course:              DIAGNOSIS  Final diagnoses:   None       DISPOSITION    ED Disposition       None            Please note that portions of this document were completed with voice recognition software.     Right Profunda Femoral Compress C     Right Profunda Femoral Augment Y     Right Proximal Femoral Spont Y     Right Proximal Femoral Phasic Y     Right Proximal Femoral Compress C     Right Proximal Femoral Augment Y     Right Mid Femoral Spont Y     Right Mid Femoral Phasic Y     Right Mid Femoral Compress C     Right Mid Femoral Augment Y     Right Distal Femoral Spont Y     Right Distal Femoral Phasic Y     Right Distal Femoral Compress C     Right Distal Femoral Augment Y     Right Popliteal Spont Y     Right Popliteal Competent Y     Right Popliteal Phasic Y     Right Popliteal Compress C     Right Popliteal Augment Y     Right Posterior Tibial Compress C     Right Peroneal Compress C     Right Gastronemius Compress C     BH CV LOWER VASCULAR RIGHT SOLEAL COMPRESS C     Right Greater Saph AK Compress C     Right Greater Saph BK Compress C     Right Lesser Saph Compress C     Left Common Femoral Spont Y     Left Common Femoral Phasic Y     Left Common Femoral Compress C     Left Common Femoral Augment Y     Left Saphenofemoral Junction Spont Y     Left Saphenofemoral Junction Phasic Y     Left Saphenofemoral Junction Compress C     Left Saphenofemoral Junction Augment Y     Left Profunda Femoral Spont Y     Left Profunda Femoral Phasic Y     Left Profunda Femoral Compress C     Left Profunda Femoral Augment Y     Left Proximal Femoral Spont Y     Left Proximal Femoral Phasic Y     Left Proximal Femoral Compress C     Left Proximal Femoral Augment Y     Left Mid Femoral Spont Y     Left Mid Femoral Phasic Y     Left Mid Femoral Compress C     Left Mid Femoral Augment Y     Left Distal Femoral Spont Y     Left Distal Femoral Phasic Y     Left Distal Femoral Compress C     Left Distal Femoral Augment Y     Left Popliteal Spont Y     Left Popliteal Phasic Y     Left Popliteal Compress C     Left Popliteal Augment Y     Left Posterior Tibial Compress C     Left Peroneal Compress C     Left Gastronemius Compress C      Lt soleal compress C     Left Greater Saph AK Compress C     Left Greater Saph BK Compress C     Left Lesser Saph Compress C     BH CV VAS PRELIMINARY FINDINGS SCRIPTING 1.0        If labs were ordered, I independently reviewed the results and considered them in treating the patient.    RADIOLOGY  XR Chest 1 View   Final Result   Impression:   Linear/streaky opacities at the left lung base likely reflect atelectasis. No chelita pulmonary edema or cardiomegaly.         Electronically Signed: Kolby Sarkar     6/4/2023 4:29 PM EDT     Workstation ID: [id number]        [x] Radiologist's Report Reviewed:  I ordered and independently reviewed the above noted radiographic studies.  See radiologist's dictation for official interpretation.      PROCEDURES    Procedures    No orders to display       MEDICATIONS GIVEN IN ER    Medications - No data to display    MEDICAL DECISION MAKING, PROGRESS, and CONSULTS   Medical Decision Making  John Chávez is a 57 y.o. male who presents to the ED c/o bilateral lower extremity edema.  Patient reports that he has had the edema for the past few months.  Patient complains of pain that started recently.  Patient denies any chest pain.  Denies any shortness of breath.  Negative for any recent travel or immobilization.  He denies any history of PE or DVT.      Problems Addressed:  Bilateral lower extremity edema: complicated acute illness or injury     Details: Negative for DVT, SVT.  Synovial cyst of left popliteal space: complicated acute illness or injury     Details: Ultrasound revealed bilateral Baker's cyst.  Synovial cyst of right popliteal space: complicated acute illness or injury     Details: ultraSound revealed bilateral Baker's cyst.    Amount and/or Complexity of Data Reviewed  Labs: ordered. Decision-making details documented in ED Course.  Radiology: ordered. Decision-making details documented in ED Course.        All labs have been independently reviewed by me.  All  radiology studies have been reviewed by me and the radiologist dictating the report.  All EKG's have been independently viewed by me.    [] Discussed with radiology regarding test interpretation:    Discussion below represents my analysis of pertinent findings related to patient's condition, differential diagnosis, treatment plan and final disposition.    Differential diagnosis:  The differential diagnosis associated with the patient's presentation includes: DVT, peripheral edema, venous insufficiency.    Additional sources  Discussed/ obtained information from independent historians:   [] Spouse  [] Parent  [] Family member  [] Friend  [] EMS   [] Other:  External (non-ED) record review:   [x] Inpatient record:   [] Office record:   [] Outpatient record:   [x] Prior Outpatient labs:   [x] Prior Outpatient radiology:   [] Primary Care record:   [] Outside ED record:   [] Other:   Patient's care impacted by:   [] Diabetes  [x] Hypertension  [x] Hyperlipidemia  [] Hypothyroidism   [x] Coronary Artery Disease   [] COPD   [] Cancer   [] Obesity  [x] GERD   [] Tobacco Abuse   [] Substance Abuse    [] Anxiety   [] Depression   [] Other:   Care significantly affected by Social Determinants of Health (housing and economic circumstances, unemployment)    [] Yes     [x] No   If yes, Patient's care significantly limited by  Social Determinants of Health including:   [] Inadequate housing   [] Low income   [] Alcoholism and drug addiction in family   [] Problems related to primary support group   [] Unemployment   [] Problems related to employment   [] Other Social Determinants of Health:     Shared decision making: Shared decision making with patient.  Patient's ultrasounds are negative for DVT.  Patient will be discharged home.  Patient encouraged to wear compression stockings and elevate extremity.  Patient does have bilateral Baker's cyst.  Patient to follow-up with Ortho.    Orders placed during this visit:  Orders Placed  This Encounter   Procedures    XR Chest 1 View    Comprehensive Metabolic Panel    CBC Auto Differential    BNP    Single High Sensitivity Troponin T    CBC & Differential       I considered prescription management  with:   [] Pain medication  [] Antiviral  [] Antibiotic   [] Other:   Rationale:  Additional orders considered but not ordered:  The following testing was considered but ultimately not selected after discussion with patient/family:    ED Course:    ED Course as of 06/19/23 1141   Sun Jun 04, 2023   1655 This patient is negative for DVT and SVT in both lower extremities. He does have Baker's cysts at both popliteal fossa and has a vascularized lymph node at the right groin. [KG]   1659 X-ray reviewed no acute findings. [KG]   1659 WBC: 8.62  Blood cell count is 8.62, hematocrit and hemoglobin are within normal limits hemoglobin is 13.2 hematocrit 38.3.  Patient's glucose is 122 and creatinine is 0.67.  Patient's BNP is less than 36 and troponin is 11. [KG]      ED Course User Index  [KG] Molly Zamarripa APRN            DIAGNOSIS  Final diagnoses:   Bilateral lower extremity edema   Synovial cyst of left popliteal space   Synovial cyst of right popliteal space       DISPOSITION    ED Disposition       ED Disposition   Discharge    Condition   Stable    Comment   --               Please note that portions of this document were completed with voice recognition software.       Molly Zamarripa APRN  06/19/23 1141

## 2023-06-05 LAB
BH CV LOWER VASCULAR LEFT COMMON FEMORAL AUGMENT: NORMAL
BH CV LOWER VASCULAR LEFT COMMON FEMORAL COMPRESS: NORMAL
BH CV LOWER VASCULAR LEFT COMMON FEMORAL PHASIC: NORMAL
BH CV LOWER VASCULAR LEFT COMMON FEMORAL SPONT: NORMAL
BH CV LOWER VASCULAR LEFT DISTAL FEMORAL AUGMENT: NORMAL
BH CV LOWER VASCULAR LEFT DISTAL FEMORAL COMPRESS: NORMAL
BH CV LOWER VASCULAR LEFT DISTAL FEMORAL PHASIC: NORMAL
BH CV LOWER VASCULAR LEFT DISTAL FEMORAL SPONT: NORMAL
BH CV LOWER VASCULAR LEFT GASTRONEMIUS COMPRESS: NORMAL
BH CV LOWER VASCULAR LEFT GREATER SAPH AK COMPRESS: NORMAL
BH CV LOWER VASCULAR LEFT GREATER SAPH BK COMPRESS: NORMAL
BH CV LOWER VASCULAR LEFT LESSER SAPH COMPRESS: NORMAL
BH CV LOWER VASCULAR LEFT MID FEMORAL AUGMENT: NORMAL
BH CV LOWER VASCULAR LEFT MID FEMORAL COMPRESS: NORMAL
BH CV LOWER VASCULAR LEFT MID FEMORAL PHASIC: NORMAL
BH CV LOWER VASCULAR LEFT MID FEMORAL SPONT: NORMAL
BH CV LOWER VASCULAR LEFT PERONEAL COMPRESS: NORMAL
BH CV LOWER VASCULAR LEFT POPLITEAL AUGMENT: NORMAL
BH CV LOWER VASCULAR LEFT POPLITEAL COMPRESS: NORMAL
BH CV LOWER VASCULAR LEFT POPLITEAL PHASIC: NORMAL
BH CV LOWER VASCULAR LEFT POPLITEAL SPONT: NORMAL
BH CV LOWER VASCULAR LEFT POSTERIOR TIBIAL COMPRESS: NORMAL
BH CV LOWER VASCULAR LEFT PROFUNDA FEMORAL AUGMENT: NORMAL
BH CV LOWER VASCULAR LEFT PROFUNDA FEMORAL COMPRESS: NORMAL
BH CV LOWER VASCULAR LEFT PROFUNDA FEMORAL PHASIC: NORMAL
BH CV LOWER VASCULAR LEFT PROFUNDA FEMORAL SPONT: NORMAL
BH CV LOWER VASCULAR LEFT PROXIMAL FEMORAL AUGMENT: NORMAL
BH CV LOWER VASCULAR LEFT PROXIMAL FEMORAL COMPRESS: NORMAL
BH CV LOWER VASCULAR LEFT PROXIMAL FEMORAL PHASIC: NORMAL
BH CV LOWER VASCULAR LEFT PROXIMAL FEMORAL SPONT: NORMAL
BH CV LOWER VASCULAR LEFT SAPHENOFEMORAL JUNCTION AUGMENT: NORMAL
BH CV LOWER VASCULAR LEFT SAPHENOFEMORAL JUNCTION COMPRESS: NORMAL
BH CV LOWER VASCULAR LEFT SAPHENOFEMORAL JUNCTION PHASIC: NORMAL
BH CV LOWER VASCULAR LEFT SAPHENOFEMORAL JUNCTION SPONT: NORMAL
BH CV LOWER VASCULAR LEFT SOLEAL COMPRESS: NORMAL
BH CV LOWER VASCULAR RIGHT COMMON FEMORAL AUGMENT: NORMAL
BH CV LOWER VASCULAR RIGHT COMMON FEMORAL COMPRESS: NORMAL
BH CV LOWER VASCULAR RIGHT COMMON FEMORAL PHASIC: NORMAL
BH CV LOWER VASCULAR RIGHT COMMON FEMORAL SPONT: NORMAL
BH CV LOWER VASCULAR RIGHT DISTAL FEMORAL AUGMENT: NORMAL
BH CV LOWER VASCULAR RIGHT DISTAL FEMORAL COMPRESS: NORMAL
BH CV LOWER VASCULAR RIGHT DISTAL FEMORAL PHASIC: NORMAL
BH CV LOWER VASCULAR RIGHT DISTAL FEMORAL SPONT: NORMAL
BH CV LOWER VASCULAR RIGHT GASTRONEMIUS COMPRESS: NORMAL
BH CV LOWER VASCULAR RIGHT GREATER SAPH AK COMPRESS: NORMAL
BH CV LOWER VASCULAR RIGHT GREATER SAPH BK COMPRESS: NORMAL
BH CV LOWER VASCULAR RIGHT LESSER SAPH COMPRESS: NORMAL
BH CV LOWER VASCULAR RIGHT MID FEMORAL AUGMENT: NORMAL
BH CV LOWER VASCULAR RIGHT MID FEMORAL COMPRESS: NORMAL
BH CV LOWER VASCULAR RIGHT MID FEMORAL PHASIC: NORMAL
BH CV LOWER VASCULAR RIGHT MID FEMORAL SPONT: NORMAL
BH CV LOWER VASCULAR RIGHT PERONEAL COMPRESS: NORMAL
BH CV LOWER VASCULAR RIGHT POPLITEAL AUGMENT: NORMAL
BH CV LOWER VASCULAR RIGHT POPLITEAL COMPETENT: NORMAL
BH CV LOWER VASCULAR RIGHT POPLITEAL COMPRESS: NORMAL
BH CV LOWER VASCULAR RIGHT POPLITEAL PHASIC: NORMAL
BH CV LOWER VASCULAR RIGHT POPLITEAL SPONT: NORMAL
BH CV LOWER VASCULAR RIGHT POSTERIOR TIBIAL COMPRESS: NORMAL
BH CV LOWER VASCULAR RIGHT PROFUNDA FEMORAL AUGMENT: NORMAL
BH CV LOWER VASCULAR RIGHT PROFUNDA FEMORAL COMPRESS: NORMAL
BH CV LOWER VASCULAR RIGHT PROFUNDA FEMORAL PHASIC: NORMAL
BH CV LOWER VASCULAR RIGHT PROFUNDA FEMORAL SPONT: NORMAL
BH CV LOWER VASCULAR RIGHT PROXIMAL FEMORAL AUGMENT: NORMAL
BH CV LOWER VASCULAR RIGHT PROXIMAL FEMORAL COMPRESS: NORMAL
BH CV LOWER VASCULAR RIGHT PROXIMAL FEMORAL PHASIC: NORMAL
BH CV LOWER VASCULAR RIGHT PROXIMAL FEMORAL SPONT: NORMAL
BH CV LOWER VASCULAR RIGHT SAPHENOFEMORAL JUNCTION AUGMENT: NORMAL
BH CV LOWER VASCULAR RIGHT SAPHENOFEMORAL JUNCTION COMPRESS: NORMAL
BH CV LOWER VASCULAR RIGHT SAPHENOFEMORAL JUNCTION PHASIC: NORMAL
BH CV LOWER VASCULAR RIGHT SAPHENOFEMORAL JUNCTION SPONT: NORMAL
BH CV LOWER VASCULAR RIGHT SOLEAL COMPRESS: NORMAL
BH CV POP FLUID COLLECT LEFT: 1
BH CV VAS POP FLUID COLLECTED: 1
BH CV VAS PRELIMINARY FINDINGS SCRIPTING: 1

## 2023-06-08 ENCOUNTER — TELEPHONE (OUTPATIENT)
Dept: CASE MANAGEMENT | Facility: OTHER | Age: 58
End: 2023-06-08
Payer: MEDICAID

## 2023-06-08 NOTE — TELEPHONE ENCOUNTER
Attempted to contact pt regarding [facility] ED visit 6/4/23 with chief c/o listed as edema.  4 attempts were made and all 4 attempts were unsuccessful.  Was able to leave message with RN-ACM contact info on all attempts.  Per protocol, will route this message to PCP, Dr. Hand (FYI).

## 2023-07-20 ENCOUNTER — APPOINTMENT (OUTPATIENT)
Dept: GENERAL RADIOLOGY | Facility: HOSPITAL | Age: 58
End: 2023-07-20
Payer: MEDICAID

## 2023-07-20 ENCOUNTER — HOSPITAL ENCOUNTER (EMERGENCY)
Facility: HOSPITAL | Age: 58
Discharge: HOME | End: 2023-07-20
Attending: EMERGENCY MEDICINE | Admitting: EMERGENCY MEDICINE
Payer: MEDICAID

## 2023-07-20 VITALS
BODY MASS INDEX: 31.84 KG/M2 | HEART RATE: 73 BPM | RESPIRATION RATE: 16 BRPM | SYSTOLIC BLOOD PRESSURE: 118 MMHG | HEIGHT: 69 IN | OXYGEN SATURATION: 93 % | DIASTOLIC BLOOD PRESSURE: 86 MMHG | WEIGHT: 215 LBS | TEMPERATURE: 98.2 F

## 2023-07-20 DIAGNOSIS — Z86.79 HISTORY OF HYPERTENSION: ICD-10-CM

## 2023-07-20 DIAGNOSIS — Z72.0 CURRENT EVERY DAY NICOTINE VAPING: ICD-10-CM

## 2023-07-20 DIAGNOSIS — B35.1 ONYCHOMYCOSIS: ICD-10-CM

## 2023-07-20 DIAGNOSIS — S91.302A OPEN WOUND OF LEFT FOOT, INITIAL ENCOUNTER: ICD-10-CM

## 2023-07-20 DIAGNOSIS — Z86.19 HISTORY OF HEPATITIS C: ICD-10-CM

## 2023-07-20 DIAGNOSIS — Z86.79 HISTORY OF CORONARY ARTERY DISEASE: ICD-10-CM

## 2023-07-20 DIAGNOSIS — R60.0 PEDAL EDEMA: Primary | ICD-10-CM

## 2023-07-20 DIAGNOSIS — Z86.39 HISTORY OF HYPERLIPIDEMIA: ICD-10-CM

## 2023-07-20 LAB
ALBUMIN SERPL-MCNC: 4.5 G/DL (ref 3.5–5.2)
ALBUMIN/GLOB SERPL: 1.7 G/DL
ALP SERPL-CCNC: 105 U/L (ref 39–117)
ALT SERPL W P-5'-P-CCNC: 39 U/L (ref 1–41)
ANION GAP SERPL CALCULATED.3IONS-SCNC: 12 MMOL/L (ref 5–15)
AST SERPL-CCNC: 34 U/L (ref 1–40)
BASOPHILS # BLD AUTO: 0.08 10*3/MM3 (ref 0–0.2)
BASOPHILS NFR BLD AUTO: 0.8 % (ref 0–1.5)
BILIRUB SERPL-MCNC: 0.3 MG/DL (ref 0–1.2)
BILIRUB UR QL STRIP: NEGATIVE
BUN SERPL-MCNC: 10 MG/DL (ref 6–20)
BUN/CREAT SERPL: 13.9 (ref 7–25)
CALCIUM SPEC-SCNC: 9.8 MG/DL (ref 8.6–10.5)
CHLORIDE SERPL-SCNC: 103 MMOL/L (ref 98–107)
CLARITY UR: CLEAR
CO2 SERPL-SCNC: 26 MMOL/L (ref 22–29)
COLOR UR: YELLOW
CREAT SERPL-MCNC: 0.72 MG/DL (ref 0.76–1.27)
D-LACTATE SERPL-SCNC: 1.1 MMOL/L (ref 0.5–2)
DEPRECATED RDW RBC AUTO: 49.5 FL (ref 37–54)
EGFRCR SERPLBLD CKD-EPI 2021: 105.2 ML/MIN/1.73
EOSINOPHIL # BLD AUTO: 0.35 10*3/MM3 (ref 0–0.4)
EOSINOPHIL NFR BLD AUTO: 3.6 % (ref 0.3–6.2)
ERYTHROCYTE [DISTWIDTH] IN BLOOD BY AUTOMATED COUNT: 14.8 % (ref 12.3–15.4)
GLOBULIN UR ELPH-MCNC: 2.7 GM/DL
GLUCOSE SERPL-MCNC: 94 MG/DL (ref 65–99)
GLUCOSE UR STRIP-MCNC: NEGATIVE MG/DL
HCT VFR BLD AUTO: 42.7 % (ref 37.5–51)
HGB BLD-MCNC: 14.5 G/DL (ref 13–17.7)
HGB UR QL STRIP.AUTO: NEGATIVE
HOLD SPECIMEN: NORMAL
HOLD SPECIMEN: NORMAL
IMM GRANULOCYTES # BLD AUTO: 0.08 10*3/MM3 (ref 0–0.05)
IMM GRANULOCYTES NFR BLD AUTO: 0.8 % (ref 0–0.5)
KETONES UR QL STRIP: NEGATIVE
LEUKOCYTE ESTERASE UR QL STRIP.AUTO: NEGATIVE
LYMPHOCYTES # BLD AUTO: 2 10*3/MM3 (ref 0.7–3.1)
LYMPHOCYTES NFR BLD AUTO: 20.6 % (ref 19.6–45.3)
MCH RBC QN AUTO: 31.3 PG (ref 26.6–33)
MCHC RBC AUTO-ENTMCNC: 34 G/DL (ref 31.5–35.7)
MCV RBC AUTO: 92.2 FL (ref 79–97)
MONOCYTES # BLD AUTO: 0.85 10*3/MM3 (ref 0.1–0.9)
MONOCYTES NFR BLD AUTO: 8.7 % (ref 5–12)
NEUTROPHILS NFR BLD AUTO: 6.36 10*3/MM3 (ref 1.7–7)
NEUTROPHILS NFR BLD AUTO: 65.5 % (ref 42.7–76)
NITRITE UR QL STRIP: NEGATIVE
NRBC BLD AUTO-RTO: 0 /100 WBC (ref 0–0.2)
NT-PROBNP SERPL-MCNC: <36 PG/ML (ref 0–900)
PH UR STRIP.AUTO: 6.5 [PH] (ref 5–8)
PLATELET # BLD AUTO: 220 10*3/MM3 (ref 140–450)
PMV BLD AUTO: 10.3 FL (ref 6–12)
POTASSIUM SERPL-SCNC: 3.8 MMOL/L (ref 3.5–5.2)
PROCALCITONIN SERPL-MCNC: 0.03 NG/ML (ref 0–0.25)
PROT SERPL-MCNC: 7.2 G/DL (ref 6–8.5)
PROT UR QL STRIP: NEGATIVE
RBC # BLD AUTO: 4.63 10*6/MM3 (ref 4.14–5.8)
SODIUM SERPL-SCNC: 141 MMOL/L (ref 136–145)
SP GR UR STRIP: 1.01 (ref 1–1.03)
TROPONIN T SERPL HS-MCNC: 13 NG/L
UROBILINOGEN UR QL STRIP: NORMAL
WBC NRBC COR # BLD: 9.72 10*3/MM3 (ref 3.4–10.8)
WHOLE BLOOD HOLD COAG: NORMAL
WHOLE BLOOD HOLD SPECIMEN: NORMAL

## 2023-07-20 PROCEDURE — 84145 PROCALCITONIN (PCT): CPT | Performed by: PHYSICIAN ASSISTANT

## 2023-07-20 PROCEDURE — 99283 EMERGENCY DEPT VISIT LOW MDM: CPT

## 2023-07-20 PROCEDURE — 93005 ELECTROCARDIOGRAM TRACING: CPT | Performed by: PHYSICIAN ASSISTANT

## 2023-07-20 PROCEDURE — 81003 URINALYSIS AUTO W/O SCOPE: CPT | Performed by: PHYSICIAN ASSISTANT

## 2023-07-20 PROCEDURE — 84484 ASSAY OF TROPONIN QUANT: CPT | Performed by: PHYSICIAN ASSISTANT

## 2023-07-20 PROCEDURE — 80053 COMPREHEN METABOLIC PANEL: CPT | Performed by: PHYSICIAN ASSISTANT

## 2023-07-20 PROCEDURE — 83880 ASSAY OF NATRIURETIC PEPTIDE: CPT | Performed by: PHYSICIAN ASSISTANT

## 2023-07-20 PROCEDURE — 85025 COMPLETE CBC W/AUTO DIFF WBC: CPT | Performed by: PHYSICIAN ASSISTANT

## 2023-07-20 PROCEDURE — 83605 ASSAY OF LACTIC ACID: CPT | Performed by: PHYSICIAN ASSISTANT

## 2023-07-20 PROCEDURE — 71045 X-RAY EXAM CHEST 1 VIEW: CPT

## 2023-07-20 RX ORDER — CEPHALEXIN 500 MG/1
500 CAPSULE ORAL 2 TIMES DAILY
Qty: 14 CAPSULE | Refills: 0 | Status: SHIPPED | OUTPATIENT
Start: 2023-07-20

## 2023-07-20 RX ORDER — SODIUM CHLORIDE 0.9 % (FLUSH) 0.9 %
10 SYRINGE (ML) INJECTION AS NEEDED
Status: DISCONTINUED | OUTPATIENT
Start: 2023-07-20 | End: 2023-07-21 | Stop reason: HOSPADM

## 2023-07-20 RX ORDER — CEPHALEXIN 250 MG/1
500 CAPSULE ORAL ONCE
Status: COMPLETED | OUTPATIENT
Start: 2023-07-20 | End: 2023-07-20

## 2023-07-20 RX ADMIN — MUPIROCIN 1 APPLICATION: 20 OINTMENT TOPICAL at 23:55

## 2023-07-20 RX ADMIN — CEPHALEXIN 500 MG: 250 CAPSULE ORAL at 23:55

## 2023-07-21 LAB — HOLD SPECIMEN: NORMAL

## 2023-07-21 NOTE — DISCHARGE INSTRUCTIONS
ER evaluation revealed normal cardiac work-up with normal EKG and normal chest x-ray.  Troponin and BNP were within normal limits and there is no concern for heart failure during ER evaluation.  Inflammatory markers in the blood including procalcitonin and lactic acid were normal.  Urinalysis revealed no protein in the blood or sugar.  Serum glucose was 94, which was normal.  Patient has a small wound to the plantar aspect of the left great toe.  Recommend no more soaking the left foot in water with Epsom salt.  Keep wound open to the air and is dry as possible.  Clean with soap and water.  Rx for mupirocin 2% ointment and recommend patient apply the ointment twice daily for 5 days to the small wound.  Rx for Keflex 500 mg by mouth twice daily x7 days.  Recommend patient to take Lasix 20 mg by mouth twice daily x3 days and then resume his normal dosage of 20 mg by mouth daily due to some swelling in both ankles.  We also will refer him to the heart failure clinic for echocardiogram, or ultrasound.  Return to the ER if worsening symptoms.  Recommend close PCP follow-up for recheck of left foot wound.

## 2023-07-21 NOTE — ED PROVIDER NOTES
Subjective   History of Present Illness  This is a 57-year-old male that presents the ER with swelling to bilateral ankles and feet x2 days.  Patient reports increased edema, but swelling is equal.  Patient was concerned that he may have an infection in his left foot, but does not really know why he believes that.  There is no redness or warmth to either foot.  He has been soaking his left foot in warm water with Epsom salt.  The skin is starting to slough off on the plantar aspect of the left foot and patient has a small open wound to the plantar aspect of the left great toe.  Patient has thickened toenails consistent with onychomycosis.  He denies any chest pain or shortness of breath.  He denies any recent new medication changes.  He denies fever or chills.  Past medical history is significant for hypertension, hyperlipidemia, history of MI, history of coronary artery disease with previous stents, history of GERD, hepatitis C, asthma, and vaping history.  Patient does not follow routinely with a cardiologist.  He takes Plavix and aspirin.  He also takes Lasix 20 mg by mouth daily.  He denies any known history of CHF.  No other concerns at this time.    History provided by:  Patient  Leg Swelling  Location:  Bilateral ankle/foot swelling  Duration:  2 days  Timing:  Constant  Progression:  Unchanged  Chronicity:  New  Context:  Pt reports increased edema to both ankles/feet. He thought maybe there was an infection to left foot and was soaking it in water with epsom salt. No CP/SOA.  No known h/o CHF.  Ineffective treatments:  Soaking left ankle/foot in water with epsom salt.  Associated symptoms: no abdominal pain, no chest pain, no cough, no diarrhea, no fatigue, no fever, no headaches, no loss of consciousness, no myalgias, no nausea, no rash, no rhinorrhea, no shortness of breath, no vomiting and no wheezing    Risk factors:  History of CAD with stents in 2015 and 2022.  Pt denies known h/o CHF.    Review of  Systems   Constitutional: Negative.  Negative for activity change, appetite change, chills, diaphoresis, fatigue and fever.   HENT:  Negative for rhinorrhea.    Respiratory: Negative.  Negative for cough, shortness of breath and wheezing.         Former smoker.  Pt continues to vape.   Cardiovascular:  Positive for leg swelling (pedal edema to both ankles/feet. No known h/o CHF.  Pt on Lasix 20 mg daily.). Negative for chest pain and palpitations.        History of CAD with previous cardiac stents.  No known history of CHF.   Gastrointestinal: Negative.  Negative for abdominal pain, diarrhea, nausea and vomiting.   Genitourinary: Negative.    Musculoskeletal: Negative.  Negative for back pain, gait problem and myalgias.   Skin:  Positive for wound (Skin is sloughing off on the bottom of left foot, small wound to plantar aspect of left great toe. No weeping or prurulent drainage.). Negative for color change and rash.   Neurological: Negative.  Negative for loss of consciousness, syncope, weakness and headaches.   All other systems reviewed and are negative.    Past Medical History:   Diagnosis Date    Asthma     GERD (gastroesophageal reflux disease)     Hepatitis C     Hyperlipidemia     Hypertension     Myocardial infarction        Allergies   Allergen Reactions    Codeine Nausea And Vomiting       Past Surgical History:   Procedure Laterality Date    CARDIAC CATHETERIZATION N/A     CORONARY ANGIOPLASTY WITH STENT PLACEMENT N/A     CORONARY ANGIOPLASTY WITH STENT PLACEMENT  04/2022    ENDOSCOPY N/A 02/02/2021    Procedure: ESOPHAGOGASTRODUODENOSCOPY;  Surgeon: Brunner, Mark I, MD;  Location: [facility] ENDOSCOPY;  Service: Gastroenterology;  Laterality: N/A;       Family History   Problem Relation Age of Onset    Aneurysm Mother     Arthritis Mother     Heart disease Father     No Known Problems Sister     No Known Problems Maternal Grandmother     No Known Problems Maternal Grandfather     No Known Problems Paternal  Grandmother     No Known Problems Paternal Grandfather        Social History     Socioeconomic History    Marital status:    Tobacco Use    Smoking status: Former     Packs/day: 0.50     Years: 30.00     Pack years: 15.00     Types: Cigarettes     Quit date:      Years since quittin.5    Smokeless tobacco: Never   Vaping Use    Vaping Use: Every day    Start date: 2022    Substances: Nicotine    Devices: Pre-filled or refillable cartridge   Substance and Sexual Activity    Alcohol use: No     Comment: Quit in     Drug use: No     Comment: 2/3/2017    Sexual activity: Defer           Objective   Physical Exam  Vitals and nursing note reviewed.   Constitutional:       General: He is not in acute distress.     Appearance: Normal appearance. He is not ill-appearing, toxic-appearing or diaphoretic.      Comments: Patient resting comfortably.  No acute sign of pain or distress.  Nontoxic.   HENT:      Head: Normocephalic and atraumatic.      Nose: Nose normal.      Mouth/Throat:      Mouth: Mucous membranes are moist.   Eyes:      Extraocular Movements: Extraocular movements intact.      Conjunctiva/sclera: Conjunctivae normal.      Pupils: Pupils are equal, round, and reactive to light.   Cardiovascular:      Rate and Rhythm: Normal rate and regular rhythm. No extrasystoles are present.     Pulses: Normal pulses.           Dorsalis pedis pulses are 2+ on the right side and 2+ on the left side.        Posterior tibial pulses are 2+ on the right side and 2+ on the left side.      Heart sounds: Normal heart sounds.      Comments: Regular rate and rhythm.  No ectopy.  Trace edema to bilateral ankles and feet, nonpitting.  No confluent erythema or warmth.  Positive DP and PT pulses bilaterally.  See skin exam for details on small wound to the plantar aspect of the left great toe.  Pulmonary:      Effort: Pulmonary effort is normal. No accessory muscle usage.      Breath sounds: Normal breath sounds. No  decreased breath sounds, wheezing, rhonchi or rales.      Comments: Lungs are clear to auscultation bilaterally  Abdominal:      General: Bowel sounds are normal.      Palpations: Abdomen is soft.   Musculoskeletal:         General: Normal range of motion.      Cervical back: Normal range of motion and neck supple.      Right lower leg: Edema present.      Left lower leg: Edema present.   Skin:     General: Skin is warm and dry.      Findings: Wound present.      Comments: The skin to the bottom of patient's left foot is wet from soaking his feet intermittently.  The skin is starting to slough off.  There is a very small, circular, 0.5 cm open wound to the plantar aspect of the left great toe.  No induration or purulent drainage.  No erythema to the foot or concern for cellulitis.  There are also cracks in the plantar aspect of the left foot from having increased moisture.   Neurological:      General: No focal deficit present.      Mental Status: He is alert.       Procedures           ED Course  ED Course as of 07/20/23 2342   Thu Jul 20, 2023   2335 EKG shows sinus rhythm.  No acute ST-T wave changes consistent with ischemia no evidence of ectopy or arrhythmia.  CBC and chemistries were completely normal.  Patient was concerned about diabetes, but serum glucose is 94.  BNP is less than 36.  High-sensitivity troponin is 13.  Lactic acid and procalcitonin are normal.  Urinalysis reveals no proteinuria and no acute infectious process.  Chest x-ray reveals no acute cardiopulmonary process and no evidence of overt failure.  I personally reviewed the chest x-ray.  Vital signs and exam are stable.  Dr. Ann, ER attending physician, also personally saw and evaluated the patient's left foot.  We recommend that patient discontinue soaking his left foot in warm water with Epsom salt.  Keep wound clean with soap and water and open to the air is much as possible.  We will prescribe mupirocin 2% ointment to the area twice  daily and we also will cover him with Keflex 500 mg by mouth twice daily x7 days.  Recommend Lasix 20 mg by mouth twice daily x3 days and then resume routinely prescribed Lasix 20 mg by mouth daily.  We will refer patient to the CHF clinic and recommend echocardiogram in the near future.  No concern for cellulitis to ankles or feet on physical exam.  Return to the ER if worsening symptoms.  Patient is agreeable with above treatment plan. [FC]      ED Course User Index  [FC] Shilpa Winchester PA-C           Recent Results (from the past 24 hour(s))   Green Top (Gel)    Collection Time: 07/20/23  8:09 PM   Result Value Ref Range    Extra Tube Hold for add-ons.    Lavender Top    Collection Time: 07/20/23  8:09 PM   Result Value Ref Range    Extra Tube hold for add-on    Gold Top - SST    Collection Time: 07/20/23  8:09 PM   Result Value Ref Range    Extra Tube Hold for add-ons.    Light Blue Top    Collection Time: 07/20/23  8:09 PM   Result Value Ref Range    Extra Tube Hold for add-ons.    Comprehensive Metabolic Panel    Collection Time: 07/20/23  8:09 PM    Specimen: Blood   Result Value Ref Range    Glucose 94 65 - 99 mg/dL    BUN 10 6 - 20 mg/dL    Creatinine 0.72 (L) 0.76 - 1.27 mg/dL    Sodium 141 136 - 145 mmol/L    Potassium 3.8 3.5 - 5.2 mmol/L    Chloride 103 98 - 107 mmol/L    CO2 26.0 22.0 - 29.0 mmol/L    Calcium 9.8 8.6 - 10.5 mg/dL    Total Protein 7.2 6.0 - 8.5 g/dL    Albumin 4.5 3.5 - 5.2 g/dL    ALT (SGPT) 39 1 - 41 U/L    AST (SGOT) 34 1 - 40 U/L    Alkaline Phosphatase 105 39 - 117 U/L    Total Bilirubin 0.3 0.0 - 1.2 mg/dL    Globulin 2.7 gm/dL    A/G Ratio 1.7 g/dL    BUN/Creatinine Ratio 13.9 7.0 - 25.0    Anion Gap 12.0 5.0 - 15.0 mmol/L    eGFR 105.2 >60.0 mL/min/1.73   BNP    Collection Time: 07/20/23  8:09 PM    Specimen: Blood   Result Value Ref Range    proBNP <36.0 0.0 - 900.0 pg/mL   Single High Sensitivity Troponin T    Collection Time: 07/20/23  8:09 PM    Specimen: Blood   Result  Value Ref Range    HS Troponin T 13 <15 ng/L   Lactic Acid, Plasma    Collection Time: 07/20/23  8:09 PM    Specimen: Blood   Result Value Ref Range    Lactate 1.1 0.5 - 2.0 mmol/L   Procalcitonin    Collection Time: 07/20/23  8:09 PM    Specimen: Blood   Result Value Ref Range    Procalcitonin 0.03 0.00 - 0.25 ng/mL   CBC Auto Differential    Collection Time: 07/20/23  8:09 PM    Specimen: Blood   Result Value Ref Range    WBC 9.72 3.40 - 10.80 10*3/mm3    RBC 4.63 4.14 - 5.80 10*6/mm3    Hemoglobin 14.5 13.0 - 17.7 g/dL    Hematocrit 42.7 37.5 - 51.0 %    MCV 92.2 79.0 - 97.0 fL    MCH 31.3 26.6 - 33.0 pg    MCHC 34.0 31.5 - 35.7 g/dL    RDW 14.8 12.3 - 15.4 %    RDW-SD 49.5 37.0 - 54.0 fl    MPV 10.3 6.0 - 12.0 fL    Platelets 220 140 - 450 10*3/mm3    Neutrophil % 65.5 42.7 - 76.0 %    Lymphocyte % 20.6 19.6 - 45.3 %    Monocyte % 8.7 5.0 - 12.0 %    Eosinophil % 3.6 0.3 - 6.2 %    Basophil % 0.8 0.0 - 1.5 %    Immature Grans % 0.8 (H) 0.0 - 0.5 %    Neutrophils, Absolute 6.36 1.70 - 7.00 10*3/mm3    Lymphocytes, Absolute 2.00 0.70 - 3.10 10*3/mm3    Monocytes, Absolute 0.85 0.10 - 0.90 10*3/mm3    Eosinophils, Absolute 0.35 0.00 - 0.40 10*3/mm3    Basophils, Absolute 0.08 0.00 - 0.20 10*3/mm3    Immature Grans, Absolute 0.08 (H) 0.00 - 0.05 10*3/mm3    nRBC 0.0 0.0 - 0.2 /100 WBC   ECG 12 Lead Other; pedal edema    Collection Time: 07/20/23  8:47 PM   Result Value Ref Range    QT Interval 400 ms    QTC Interval 438 ms   Urinalysis With Microscopic If Indicated (No Culture) - Urine, Clean Catch    Collection Time: 07/20/23  8:51 PM    Specimen: Urine, Clean Catch   Result Value Ref Range    Color, UA Yellow Yellow, Straw    Appearance, UA Clear Clear    pH, UA 6.5 5.0 - 8.0    Specific Gravity, UA 1.007 1.001 - 1.030    Glucose, UA Negative Negative    Ketones, UA Negative Negative    Bilirubin, UA Negative Negative    Blood, UA Negative Negative    Protein, UA Negative Negative    Leuk Esterase, UA Negative  Negative    Nitrite, UA Negative Negative    Urobilinogen, UA 0.2 E.U./dL 0.2 - 1.0 E.U./dL     Note: In addition to lab results from this visit, the labs listed above may include labs taken at another facility or during a different encounter within the last 24 hours. Please correlate lab times with ED admission and discharge times for further clarification of the services performed during this visit.    XR Chest 1 View   Final Result   Impression:   No acute cardiopulmonary findings.            Electronically Signed: Kolby Sarkar     7/20/2023 8:57 PM EDT     Workstation ID: [id number]        Vitals:    07/20/23 2100 07/20/23 2114 07/20/23 2115 07/20/23 2130   BP: 121/81  122/92 118/86   BP Location:       Patient Position:       Pulse: 73 76 80 73   Resp:  16     Temp:       TempSrc:       SpO2: 94% 95% 95% 93%   Weight:       Height:         Medications   sodium chloride 0.9 % flush 10 mL (has no administration in time range)   cephalexin (KEFLEX) capsule 500 mg (has no administration in time range)   mupirocin (BACTROBAN) 2 % ointment 1 application  (has no administration in time range)     ECG/EMG Results (last 24 hours)       ** No results found for the last 24 hours. **          ECG 12 Lead Other; pedal edema   Preliminary Result   Test Reason : Other~   Blood Pressure :   */*   mmHG   Vent. Rate :  72 BPM     Atrial Rate :  72 BPM      P-R Int : 136 ms          QRS Dur :  92 ms       QT Int : 400 ms       P-R-T Axes :  38  84  73 degrees      QTc Int : 438 ms      ** Poor data quality, interpretation may be adversely affected   Normal sinus rhythm   Normal ECG   When compared with ECG of 09-OCT-2021 12:16,   No significant change was found      Referred By: SHAHNAZ           Confirmed By:                                           Medical Decision Making  Amount and/or Complexity of Data Reviewed  Labs: ordered.  Radiology: ordered.  ECG/medicine tests: ordered.    Risk  Prescription drug  management.        Final diagnoses:   Pedal edema   Open wound of left foot, initial encounter   History of hypertension   History of hyperlipidemia   History of coronary artery disease   History of hepatitis C   Current every day nicotine vaping   Onychomycosis       ED Disposition  ED Disposition       ED Disposition   Discharge    Condition   Stable    Comment   --               [facility] CARDIOLOGY  [address]  [address]  [facility] [address] [phone]  Call in 1 day  Call tomorrow for first available Brianda Thapa MD  [address]  [facility] [address] [phone]    Schedule an appointment as soon as possible for a visit in 2 days  Close PCP follow-up for [facility] EMERGENCY DEPARTMENT  [address]  [facility] [address] [phone]    If symptoms worsen         Medication List        New Prescriptions      cephalexin 500 MG capsule  Commonly known as: KEFLEX  Take 1 capsule by mouth 2 (Two) Times a Day.     mupirocin 2 % ointment  Commonly known as: BACTROBAN  Apply 1 application  topically to the appropriate area as directed 2 (Two) Times a Day for 11 days.               Where to Get Your Medications        These medications were sent to [facility] - [place], [place] - [address] - [phone]  - [phone]   [address] [facility] [address]      Phone: [phone]   cephalexin 500 MG capsule  mupirocin 2 % ointment            Shilpa Winchester PA-C  07/20/23 8168

## 2023-07-22 LAB
QT INTERVAL: 400 MS
QTC INTERVAL: 438 MS

## 2023-08-03 ENCOUNTER — TELEPHONE (OUTPATIENT)
Dept: CARDIOLOGY | Facility: HOSPITAL | Age: 58
End: 2023-08-03
Payer: MEDICAID

## (undated) DEVICE — HYBRID CO2 TUBING/CAP SET FOR OLYMPUS® SCOPES & CO2 SOURCE: Brand: ERBE

## (undated) DEVICE — THE BITE BLOCK MAXI, LATEX FREE STRAP IS USED TO PROTECT THE ENDOSCOPE INSERTION TUBE FROM BEING BITTEN BY THE PATIENT.

## (undated) DEVICE — INTRO ACCSR BLNT TP

## (undated) DEVICE — SOL IRR H2O BTL 1000ML STRL

## (undated) DEVICE — KT ORCA ORCAPOD DISP STRL

## (undated) DEVICE — SPNG ENDO BEDSIDE TUB ENZYM

## (undated) DEVICE — CONTN GRAD MEAS TRIANG 32OZ BLK

## (undated) DEVICE — SYR LUERLOK 50ML

## (undated) DEVICE — SINGLE-USE BIOPSY FORCEPS: Brand: RADIAL JAW 4

## (undated) DEVICE — LUBE GEL ENDOGLIDE 1.1OZ

## (undated) DEVICE — TUBING, SUCTION, 1/4" X 10', STRAIGHT: Brand: MEDLINE